# Patient Record
Sex: MALE | Race: WHITE | NOT HISPANIC OR LATINO
[De-identification: names, ages, dates, MRNs, and addresses within clinical notes are randomized per-mention and may not be internally consistent; named-entity substitution may affect disease eponyms.]

---

## 2017-08-02 ENCOUNTER — PRIOR ORIGINAL RECORDS (OUTPATIENT)
Dept: OTHER | Age: 82
End: 2017-08-02

## 2017-08-28 LAB
CHOLESTEROL, TOTAL: 136 MG/DL
HDL CHOLESTEROL: 76 MG/DL
LDL CHOLESTEROL: 51 MG/DL
TRIGLYCERIDES: 44 MG/DL

## 2017-09-15 PROBLEM — I50.22 CHRONIC SYSTOLIC HEART FAILURE (HCC): Status: ACTIVE | Noted: 2017-09-15

## 2017-09-15 PROBLEM — I95.1 ORTHOSTATIC HYPOTENSION: Status: ACTIVE | Noted: 2017-09-15

## 2017-09-15 PROBLEM — K21.9 GASTROESOPHAGEAL REFLUX DISEASE, ESOPHAGITIS PRESENCE NOT SPECIFIED: Status: ACTIVE | Noted: 2017-09-15

## 2017-09-15 PROBLEM — E11.9 TYPE 2 DIABETES MELLITUS WITHOUT COMPLICATION, WITHOUT LONG-TERM CURRENT USE OF INSULIN (HCC): Status: ACTIVE | Noted: 2017-09-15

## 2017-09-15 PROBLEM — Z86.73 HISTORY OF STROKE: Status: ACTIVE | Noted: 2017-09-15

## 2017-09-27 ENCOUNTER — PRIOR ORIGINAL RECORDS (OUTPATIENT)
Dept: OTHER | Age: 82
End: 2017-09-27

## 2017-09-30 ENCOUNTER — PRIOR ORIGINAL RECORDS (OUTPATIENT)
Dept: OTHER | Age: 82
End: 2017-09-30

## 2017-09-30 ENCOUNTER — LAB ENCOUNTER (OUTPATIENT)
Dept: LAB | Facility: HOSPITAL | Age: 82
End: 2017-09-30
Attending: INTERNAL MEDICINE
Payer: MEDICARE

## 2017-09-30 DIAGNOSIS — E78.00 HYPERCHOLESTEREMIA: ICD-10-CM

## 2017-09-30 DIAGNOSIS — E11.9 TYPE 2 DIABETES MELLITUS WITHOUT COMPLICATION, WITHOUT LONG-TERM CURRENT USE OF INSULIN (HCC): ICD-10-CM

## 2017-09-30 DIAGNOSIS — E55.9 VITAMIN D DEFICIENCY: ICD-10-CM

## 2017-09-30 DIAGNOSIS — I50.22 CHRONIC SYSTOLIC HEART FAILURE (HCC): ICD-10-CM

## 2017-09-30 PROCEDURE — 36415 COLL VENOUS BLD VENIPUNCTURE: CPT

## 2017-09-30 PROCEDURE — 83880 ASSAY OF NATRIURETIC PEPTIDE: CPT

## 2017-09-30 PROCEDURE — 84550 ASSAY OF BLOOD/URIC ACID: CPT

## 2017-09-30 PROCEDURE — 80053 COMPREHEN METABOLIC PANEL: CPT

## 2017-09-30 PROCEDURE — 84443 ASSAY THYROID STIM HORMONE: CPT

## 2017-09-30 PROCEDURE — 85025 COMPLETE CBC W/AUTO DIFF WBC: CPT

## 2017-09-30 PROCEDURE — 82306 VITAMIN D 25 HYDROXY: CPT

## 2017-09-30 PROCEDURE — 80061 LIPID PANEL: CPT

## 2017-09-30 PROCEDURE — 83036 HEMOGLOBIN GLYCOSYLATED A1C: CPT

## 2017-10-02 ENCOUNTER — PRIOR ORIGINAL RECORDS (OUTPATIENT)
Dept: OTHER | Age: 82
End: 2017-10-02

## 2017-10-02 LAB
ALBUMIN: 4.5 G/DL
ALKALINE PHOSPHATATE(ALK PHOS): 69 IU/L
ALT (SGPT): 16 U/L
AST (SGOT): 21 U/L
BILIRUBIN TOTAL: 1 MG/DL
BUN: 57 MG/DL
CALCIUM: 9.9 MG/DL
CHLORIDE: 103 MEQ/L
CHOLESTEROL, TOTAL: 168 MG/DL
CREATININE, SERUM: 2.54 MG/DL
GLOBULIN: 2.9 G/DL
GLUCOSE: 158 MG/DL
GLUCOSE: 158 MG/DL
GLYCOHEMOGLOBIN: 6.4 %
HDL CHOLESTEROL: 68 MG/DL
HEMATOCRIT: 38.5 %
HEMOGLOBIN: 12.6 G/DL
LDL CHOLESTEROL: 78 MG/DL
NON-HDL CHOLESTEROL: 100 MG/DL
PLATELETS: 126 K/UL
POTASSIUM, SERUM: 4.7 MEQ/L
PROTEIN, TOTAL: 7.4 G/DL
RED BLOOD COUNT: 4.25 X 10-6/U
SGOT (AST): 21 IU/L
SGPT (ALT): 16 IU/L
SODIUM: 141 MEQ/L
THYROID STIMULATING HORMONE: 1.4 MLU/L
TRIGLYCERIDES: 112 MG/DL
URIC ACID: 10.4 MG/DL
WHITE BLOOD COUNT: 8.1 X 10-3/U

## 2017-10-03 LAB — VITAMIN D 25-OH: 26.3 NG/ML

## 2017-10-13 ENCOUNTER — PRIOR ORIGINAL RECORDS (OUTPATIENT)
Dept: OTHER | Age: 82
End: 2017-10-13

## 2017-10-17 ENCOUNTER — PRIOR ORIGINAL RECORDS (OUTPATIENT)
Dept: OTHER | Age: 82
End: 2017-10-17

## 2017-10-20 ENCOUNTER — PRIOR ORIGINAL RECORDS (OUTPATIENT)
Dept: OTHER | Age: 82
End: 2017-10-20

## 2017-10-25 ENCOUNTER — PRIOR ORIGINAL RECORDS (OUTPATIENT)
Dept: OTHER | Age: 82
End: 2017-10-25

## 2017-11-07 ENCOUNTER — PRIOR ORIGINAL RECORDS (OUTPATIENT)
Dept: OTHER | Age: 82
End: 2017-11-07

## 2017-11-08 ENCOUNTER — PRIOR ORIGINAL RECORDS (OUTPATIENT)
Dept: OTHER | Age: 82
End: 2017-11-08

## 2017-11-08 LAB
BUN: 44 MG/DL
CALCIUM: 9.6 MG/DL
CHLORIDE: 103 MEQ/L
CREATININE, SERUM: 2.44 MG/DL
GLUCOSE: 207 MG/DL
POTASSIUM, SERUM: 5.1 MEQ/L
SODIUM: 139 MEQ/L

## 2017-11-10 ENCOUNTER — PRIOR ORIGINAL RECORDS (OUTPATIENT)
Dept: OTHER | Age: 82
End: 2017-11-10

## 2017-11-15 ENCOUNTER — PRIOR ORIGINAL RECORDS (OUTPATIENT)
Dept: OTHER | Age: 82
End: 2017-11-15

## 2017-11-16 LAB
BUN: 45 MG/DL
CALCIUM: 9.9 MG/DL
CHLORIDE: 103 MEQ/L
CREATININE, SERUM: 2.61 MG/DL
GLUCOSE: 134 MG/DL
POTASSIUM, SERUM: 5.1 MEQ/L
SODIUM: 141 MEQ/L

## 2017-11-17 ENCOUNTER — PRIOR ORIGINAL RECORDS (OUTPATIENT)
Dept: OTHER | Age: 82
End: 2017-11-17

## 2017-11-27 ENCOUNTER — HOSPITAL ENCOUNTER (INPATIENT)
Facility: HOSPITAL | Age: 82
LOS: 3 days | Discharge: HOME | DRG: 291 | End: 2017-11-30
Attending: EMERGENCY MEDICINE | Admitting: HOSPITALIST
Payer: MEDICARE

## 2017-11-27 ENCOUNTER — APPOINTMENT (OUTPATIENT)
Dept: GENERAL RADIOLOGY | Facility: HOSPITAL | Age: 82
DRG: 291 | End: 2017-11-27
Attending: EMERGENCY MEDICINE
Payer: MEDICARE

## 2017-11-27 DIAGNOSIS — I50.9 ACUTE CONGESTIVE HEART FAILURE, UNSPECIFIED CONGESTIVE HEART FAILURE TYPE: Primary | ICD-10-CM

## 2017-11-27 PROCEDURE — 85025 COMPLETE CBC W/AUTO DIFF WBC: CPT | Performed by: EMERGENCY MEDICINE

## 2017-11-27 PROCEDURE — 84484 ASSAY OF TROPONIN QUANT: CPT | Performed by: EMERGENCY MEDICINE

## 2017-11-27 PROCEDURE — 80061 LIPID PANEL: CPT | Performed by: EMERGENCY MEDICINE

## 2017-11-27 PROCEDURE — 82962 GLUCOSE BLOOD TEST: CPT

## 2017-11-27 PROCEDURE — 93005 ELECTROCARDIOGRAM TRACING: CPT

## 2017-11-27 PROCEDURE — 71010 XR CHEST AP PORTABLE  (CPT=71010): CPT | Performed by: EMERGENCY MEDICINE

## 2017-11-27 PROCEDURE — 93010 ELECTROCARDIOGRAM REPORT: CPT | Performed by: EMERGENCY MEDICINE

## 2017-11-27 PROCEDURE — 80162 ASSAY OF DIGOXIN TOTAL: CPT | Performed by: EMERGENCY MEDICINE

## 2017-11-27 PROCEDURE — 99285 EMERGENCY DEPT VISIT HI MDM: CPT

## 2017-11-27 PROCEDURE — 96374 THER/PROPH/DIAG INJ IV PUSH: CPT

## 2017-11-27 PROCEDURE — 83880 ASSAY OF NATRIURETIC PEPTIDE: CPT | Performed by: EMERGENCY MEDICINE

## 2017-11-27 PROCEDURE — 80048 BASIC METABOLIC PNL TOTAL CA: CPT | Performed by: EMERGENCY MEDICINE

## 2017-11-27 RX ORDER — LORAZEPAM 0.5 MG/1
0.5 TABLET ORAL ONCE AS NEEDED
Status: COMPLETED | OUTPATIENT
Start: 2017-11-27 | End: 2017-11-27

## 2017-11-27 RX ORDER — ZOLPIDEM TARTRATE 5 MG/1
5 TABLET ORAL NIGHTLY PRN
Status: DISCONTINUED | OUTPATIENT
Start: 2017-11-27 | End: 2017-11-27

## 2017-11-27 RX ORDER — FINASTERIDE 5 MG/1
5 TABLET, FILM COATED ORAL NIGHTLY
Status: DISCONTINUED | OUTPATIENT
Start: 2017-11-27 | End: 2017-11-30

## 2017-11-27 RX ORDER — NITROGLYCERIN 0.4 MG/1
0.4 TABLET SUBLINGUAL EVERY 5 MIN PRN
Status: DISCONTINUED | OUTPATIENT
Start: 2017-11-27 | End: 2017-11-30

## 2017-11-27 RX ORDER — DEXTROSE MONOHYDRATE 25 G/50ML
50 INJECTION, SOLUTION INTRAVENOUS AS NEEDED
Status: DISCONTINUED | OUTPATIENT
Start: 2017-11-27 | End: 2017-11-30

## 2017-11-27 RX ORDER — ACETAMINOPHEN 325 MG/1
650 TABLET ORAL EVERY 6 HOURS PRN
Status: DISCONTINUED | OUTPATIENT
Start: 2017-11-27 | End: 2017-11-30

## 2017-11-27 RX ORDER — CLOPIDOGREL BISULFATE 75 MG/1
75 TABLET ORAL DAILY
Status: DISCONTINUED | OUTPATIENT
Start: 2017-11-28 | End: 2017-11-30

## 2017-11-27 RX ORDER — SUCRALFATE 1 G/1
1 TABLET ORAL 2 TIMES DAILY PRN
Status: DISCONTINUED | OUTPATIENT
Start: 2017-11-27 | End: 2017-11-30

## 2017-11-27 RX ORDER — ACETAMINOPHEN 325 MG/1
650 TABLET ORAL EVERY 6 HOURS PRN
COMMUNITY

## 2017-11-27 RX ORDER — DIGOXIN 125 MCG
125 TABLET ORAL DAILY
Status: DISCONTINUED | OUTPATIENT
Start: 2017-11-27 | End: 2017-11-29

## 2017-11-27 RX ORDER — PANTOPRAZOLE SODIUM 40 MG/1
40 TABLET, DELAYED RELEASE ORAL
Status: DISCONTINUED | OUTPATIENT
Start: 2017-11-27 | End: 2017-11-30

## 2017-11-27 RX ORDER — BUMETANIDE 0.25 MG/ML
2 INJECTION, SOLUTION INTRAMUSCULAR; INTRAVENOUS ONCE
Status: COMPLETED | OUTPATIENT
Start: 2017-11-27 | End: 2017-11-27

## 2017-11-27 RX ORDER — ATORVASTATIN CALCIUM 10 MG/1
10 TABLET, FILM COATED ORAL NIGHTLY
Status: DISCONTINUED | OUTPATIENT
Start: 2017-11-27 | End: 2017-11-30

## 2017-11-27 RX ORDER — ONDANSETRON 2 MG/ML
4 INJECTION INTRAMUSCULAR; INTRAVENOUS EVERY 6 HOURS PRN
Status: DISCONTINUED | OUTPATIENT
Start: 2017-11-27 | End: 2017-11-30

## 2017-11-27 NOTE — HISTORICAL OFFICE NOTE
JAZMYNE REINA  : 10/30/1935  ACCOUNT:  [id number]  [phone]  PCP: Dr. Freddie Sterling     TODAY'S DATE: 11/10/2017  DICTATED BY:  Jessica RAYGOZA]    CHIEF COMPLAINT: [Followup of Cardiomyopathy, ischemic, Followup of Chronic Kidney Disease, Stage 2 since results the patient states that historically he has had potassiums higher at about 5.3. He does try to follow a low potassium diet. NYHA Class: 2  RISK FACTORS:    REVIEW OF SYSTEMS:    CONS: fatigue. EYES: denies significant visual changes.  ENMT pulses normal. ABD AORTA: deferred. FEM: deferred. PEDAL: pedal pulses intact. EXT: no peripheral edema. DECISION MAKIN. Ischemic cardiomyopathy patient appears compensated on exam today.   His weight is stable he has no signs or symptoms of vol 10/25/17 *Eliquis              2.5MG     1 TABLET BY MOUTH TWICE A DAY            10/20/17 *Allopurinol          100MG     1 TABLET DAILY.                           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 RESP: respirations with normal rate and rhythm, clear to auscultation. GI: no masses, tenderness or hepatosplenomegaly, rectal deferred. MS: adequate gait for exercise/testing. EXT: no clubbing or cyanosis.   SKIN: no r tolerating, will increase to 49/51 mg twice daily. 3.  Eliquis 2.5 mg twice daily. 4.  Dr. Trevin Jackson visit in December. 5.  Renal service evaluation.     Cc: Dr. 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   The patient carries a history of myocardial infarction in 2014 with anne replacement and 2005    PAST CV HISTORY: bypass x 2 , mitral valve replacement 2015, hypercholesteremia, and hypertension    FAMILY HISTORY: Negative for premature CAD. Negative for AAA. SOCIAL HISTORY: SMOKING: Never used tobacco. Denies smoking.  CAFFE failure physician. He maintains in [address] class 3. We will establish him with the [facility] and with the Renal Service. We will obtain repeat echocardiogram and laboratory data to assess for clinical response.   He would be an

## 2017-11-27 NOTE — ED INITIAL ASSESSMENT (HPI)
Patient complains of lili x2-3 days, states it got worse this morning, states he has not been sleeping well because of it, denies pain, no apparent distress noted during triage

## 2017-11-27 NOTE — CONSULTS
Cardiology (consult dictated)    Assessment:  1. Dyspnea. Known severe LV dysfunction. Probable mild CHF. PE less likely on Eliquis. Ischemia cannot be ruled out. 2. CKD, Stage III-IV    3. Atrial fibrillation. 4. ICD    5.  DM      Plan:  Admit over

## 2017-11-27 NOTE — H&P
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     Acute on chronic systolic HF  - presented w/ SOB  - bumex per cards  - appreciate cards peripheral pulses intact   PSYCH: appropriate mood and affect    PMH  Past Medical History:   Diagnosis Date   • Atrial fibrillation ([facility] [address]    • Diabetes ([facility] [address]    • Esophageal reflux    • Essential hypertension    • Heart attack    • Hyperlipidemia    • Str

## 2017-11-27 NOTE — ED PROVIDER NOTES
Patient Seen in: [facility] AND [facility] Emergency Department    History   Patient presents with:  Dyspnea ISRAEL SOB (respiratory)    Stated Complaint: dyspnea    HPI    57-year-old male not [place] with a history of significant cardiac disease as well as some or tenderness. Neurological: Alert and oriented to person, place, and time. Skin: Skin is warm and dry. Psychiatric: Normal mood and affect. Behavior is normal.   Nursing note and vitals reviewed.     Differential diagnosis includes pulmonary edema a TALL (BNP)     EKG    Rate, intervals and axes as noted on EKG Report.   Rate: 78  Rhythm: Sinus Rhythm  Reading: Paced rhythm, frequent PVCs, no obvious acute ischemia           ED Course as of Nov 27 1403  ------------------------------------------------- Medication List        Present on Admission  Date Reviewed: 9/15/2017          ICD-10-CM Noted POA    Acute congestive heart failure ([facility] [address] I50.9 11/27/2017 Unknown

## 2017-11-27 NOTE — CONSULTS
[facility]    PATIENT'S NAME: JAZMYNE REINA   ATTENDING PHYSICIAN: Raheem Thibodeaux, [address] PHYSICIAN: Meg Yuan.  Junito Francois MD   PATIENT ACCOUNT#:   [id number]    LOCATION:  [facility] [address] [facility] 1  MEDICAL RECORD #:   [id number]       DATE OF BIRTH: Entresto 47 to 53 one tab in the morning and half a tablet in the evening; Eliquis 2.5 b.i.d.; allopurinol 100 q.a.m.; vitamin D 1000 units daily;  Allegra 180 q.a.m.; Bumex 0.5 mg in the morning and then as needed in the evening; carvedilol 3.125 only at b history of cardiomyopathy and the markedly elevated BNP. Ischemia cannot be entirely ruled out. He did not have any chest pain with his large inferior infarct that occurred prior to his bypass. Pulmonary embolus is less likely on his Eliquis treatment.

## 2017-11-28 PROCEDURE — 82962 GLUCOSE BLOOD TEST: CPT

## 2017-11-28 PROCEDURE — 83735 ASSAY OF MAGNESIUM: CPT | Performed by: HOSPITALIST

## 2017-11-28 PROCEDURE — 80048 BASIC METABOLIC PNL TOTAL CA: CPT | Performed by: HOSPITALIST

## 2017-11-28 RX ORDER — CARVEDILOL 6.25 MG/1
6.25 TABLET ORAL 2 TIMES DAILY WITH MEALS
Status: DISCONTINUED | OUTPATIENT
Start: 2017-11-28 | End: 2017-11-28

## 2017-11-28 RX ORDER — 0.9 % SODIUM CHLORIDE 0.9 %
VIAL (ML) INJECTION
Status: COMPLETED
Start: 2017-11-28 | End: 2017-11-28

## 2017-11-28 RX ORDER — CARVEDILOL 3.12 MG/1
3.12 TABLET ORAL 2 TIMES DAILY WITH MEALS
Status: DISCONTINUED | OUTPATIENT
Start: 2017-11-28 | End: 2017-11-28

## 2017-11-28 RX ORDER — MAGNESIUM OXIDE 400 MG (241.3 MG MAGNESIUM) TABLET
1 TABLET ONCE AS NEEDED
Status: DISCONTINUED | OUTPATIENT
Start: 2017-11-28 | End: 2017-11-28

## 2017-11-28 RX ORDER — CARVEDILOL 3.12 MG/1
3.12 TABLET ORAL 2 TIMES DAILY WITH MEALS
Status: DISCONTINUED | OUTPATIENT
Start: 2017-11-28 | End: 2017-11-29

## 2017-11-28 RX ORDER — MAGNESIUM OXIDE 400 MG (241.3 MG MAGNESIUM) TABLET
3 TABLET NIGHTLY
Status: DISCONTINUED | OUTPATIENT
Start: 2017-11-28 | End: 2017-11-29

## 2017-11-28 NOTE — PROGRESS NOTES
[facility] Hospitalist Progress Note     CC: Hospital Follow up    PCP: Kristi Puentes MD       Assessment/Plan:     Principal Problem:    Acute congestive heart failure, unspecified congestive heart failure type [facility])  Active Problems:    Acute Mago Piety SpO2 99 %.     Temp:  [97.4 °F (36.3 °C)-98.2 °F (36.8 °C)] 97.4 °F (36.3 °C)  Pulse:  [69-88] 70  Resp:  [14-18] 16  BP: ()/(44-72) 86/57      Intake/Output:    Intake/Output Summary (Last 24 hours) at 11/28/17 2285  Last data filed at 11/28/17 4425 dextrose 50%, Glucose-Vitamin C, acetaminophen, ondansetron HCl, sucralfate

## 2017-11-28 NOTE — PROGRESS NOTES
[facility]    Progress Note    Murzena Mckeon Patient Status:  Inpatient    10/30/1935 MRN [id number]   Location [facility] 3W/SW Attending Gaurav Lyons MD   Hosp Day # 1 PCP Rajani Cabrera MD         Assessment and Pl Results:     Lab Results  Component Value Date   WBC 7.7 11/27/2017   HGB 11.8 (L) 11/27/2017   HCT 35.2 (L) 11/27/2017    (L) 11/27/2017   CREATSERUM 2.23 (H) 11/28/2017   BUN 38 (H) 11/28/2017    11/28/2017   K 4.4 11/28/2017   C

## 2017-11-28 NOTE — PROGRESS NOTES
[facility] Pharmacy Note:  Age Based Dose Adjustment    Yobani Amos has been prescribed zolpidem (AMBIEN) 10 mg PO nightly PRN. Patient is >71 years old therefore the dose has been adjusted to 5 mg, may repeat x1 dose if needed.       Thank you,  Alondra Acosta, [facility]

## 2017-11-29 PROCEDURE — 80048 BASIC METABOLIC PNL TOTAL CA: CPT | Performed by: INTERNAL MEDICINE

## 2017-11-29 PROCEDURE — 85027 COMPLETE CBC AUTOMATED: CPT | Performed by: HOSPITALIST

## 2017-11-29 PROCEDURE — 82962 GLUCOSE BLOOD TEST: CPT

## 2017-11-29 RX ORDER — LORAZEPAM 0.5 MG/1
0.5 TABLET ORAL NIGHTLY PRN
Status: DISCONTINUED | OUTPATIENT
Start: 2017-11-29 | End: 2017-11-30

## 2017-11-29 RX ORDER — METOPROLOL SUCCINATE 25 MG/1
12.5 TABLET, EXTENDED RELEASE ORAL NIGHTLY
Status: DISCONTINUED | OUTPATIENT
Start: 2017-11-29 | End: 2017-11-30

## 2017-11-29 RX ORDER — TRAZODONE HYDROCHLORIDE 50 MG/1
25 TABLET ORAL ONCE
Status: COMPLETED | OUTPATIENT
Start: 2017-11-29 | End: 2017-11-29

## 2017-11-29 RX ORDER — DIGOXIN 125 MCG
125 TABLET ORAL EVERY OTHER DAY
Status: DISCONTINUED | OUTPATIENT
Start: 2017-11-30 | End: 2017-11-30

## 2017-11-29 NOTE — PLAN OF CARE
Patient O2 sat 82-87% on room air. States he is not in distress.  present. Patient denies O2; states he is going bact to [facility] and does not want O2.

## 2017-11-29 NOTE — PROGRESS NOTES
S: No c/o     O:   Blood pressure (!) 77/51, pulse 78, temperature 98.4 °F (36.9 °C), temperature source Oral, resp. rate 20, height 170.2 cm (5' 7\"), weight 144 lb 1.6 oz (65.4 kg), SpO2 98 %.        11/28/17  2111 11/29/17  0306 11/29/17  0500 11/29/17 complication, without long-term current use of insulin (HCC)     Gastroesophageal reflux disease, esophagitis presence not specified     Chronic systolic heart failure (HCC)     Acute congestive heart failure (HCC)     Acute congestive heart failure, unspe

## 2017-11-29 NOTE — PROGRESS NOTES
Core measure update: EF 10–15%. On carvedilol. On Entresto.   Currently no Spironolactone due to low blood pressures, may consider in the future if clinically appropriate

## 2017-11-29 NOTE — PROGRESS NOTES
[facility] Hospitalist Progress Note     CC: Hospital Follow up    PCP: Gisella William MD       Assessment/Plan:     Principal Problem:    Acute congestive heart failure, unspecified congestive heart failure type [facility])  Active Problems:    Acute Lola Chiu strong, would rather have ativan. No dizziness. Still with mild SOB. OBJECTIVE:    Blood pressure 90/61, pulse 70, temperature 98.4 °F (36.9 °C), temperature source Oral, resp.  rate 20, height 170.2 cm (5' 7\"), weight 144 lb 1.6 oz (65.4 kg), SpO2 98 abnormality.            Meds:     • [START ON 11/30/2017] digoxin  125 mcg Oral QOD   • Insulin Aspart Pen  1-5 Units Subcutaneous TID CC   • apixaban  2.5 mg Oral BID   • Clopidogrel Bisulfate  75 mg Oral Daily   • finasteride  5 mg Oral Nightly   • Pantop

## 2017-11-30 ENCOUNTER — PRIOR ORIGINAL RECORDS (OUTPATIENT)
Dept: OTHER | Age: 82
End: 2017-11-30

## 2017-11-30 VITALS
HEART RATE: 70 BPM | RESPIRATION RATE: 19 BRPM | BODY MASS INDEX: 22.74 KG/M2 | OXYGEN SATURATION: 100 % | TEMPERATURE: 98 F | HEIGHT: 67 IN | WEIGHT: 144.88 LBS | SYSTOLIC BLOOD PRESSURE: 105 MMHG | DIASTOLIC BLOOD PRESSURE: 69 MMHG

## 2017-11-30 PROCEDURE — 80048 BASIC METABOLIC PNL TOTAL CA: CPT | Performed by: HOSPITALIST

## 2017-11-30 PROCEDURE — 82962 GLUCOSE BLOOD TEST: CPT

## 2017-11-30 RX ORDER — METOPROLOL SUCCINATE 25 MG/1
12.5 TABLET, EXTENDED RELEASE ORAL NIGHTLY
Qty: 30 TABLET | Refills: 1 | Status: SHIPPED | OUTPATIENT
Start: 2017-11-30 | End: 2018-01-16

## 2017-11-30 RX ORDER — BUMETANIDE 1 MG/1
0.5 TABLET ORAL DAILY
Status: DISCONTINUED | OUTPATIENT
Start: 2017-11-30 | End: 2017-11-30

## 2017-11-30 RX ORDER — BUMETANIDE 0.5 MG/1
TABLET ORAL
Qty: 90 TABLET | Refills: 1 | Status: SHIPPED | OUTPATIENT
Start: 2017-11-30 | End: 2018-02-01

## 2017-11-30 NOTE — DISCHARGE SUMMARY
[facility] Hospitalist Discharge Summary   Patient ID:  Aniket Ellis  [id number]  17 year old  10/30/1935    Admit date: 11/27/2017  Discharge date: 11/30/2017  Primary Care Physician: Mane Hoover MD   Attending Physician: Jade Gary DO   Consults: hypotension     Afib  - rate controlled  - coreg changed to toprol  - cont digoxin  - cont eliquis     CAD s/p CABG, hx of MVR  - cont home meds  - on plavix, BB, statin, entresto     Weight loss/dysphagia  - previously followed with GI, but recently moved as: LANOXIN  TAKE 1 TABLET BY MOUTH EVERY OTHER DAY     ENTRESTO 24-26 MG Tabs  Generic drug:  Sacubitril-Valsartan  Take 1 tablet by mouth 2 (two) times daily.      GLUCOCARD VITAL TEST Strp  Testing twice daily     NASONEX NA     Pantoprazole Sodium 40 M with orthopedic spine physician  Contact information:  [address] [facility]             Schedule an appointment as soon as possible for a visit with Janett Knott MD.    Specialties:  CARDIOLOGY, Internal Medic

## 2017-11-30 NOTE — CM/SW NOTE
11/30/17 CM Discharge planning   Pt resides at THE [facility] apt, reports independent prior to admission and plans to return home when medically stable.    Transport home arranged via [employer], today at 1:30   [employer]  MEGHANA [id number]

## 2017-11-30 NOTE — PROGRESS NOTES
S: No c/o , wants to go home     O:   Blood pressure 105/69, pulse 71, temperature 98 °F (36.7 °C), temperature source Oral, resp. rate 19, height 170.2 cm (5' 7\"), weight 144 lb 14.4 oz (65.7 kg), SpO2 100 %.        11/29/17 2000 11/30/17  0502 11/30/17 disease, esophagitis presence not specified     Chronic systolic heart failure (HCC)     Acute congestive heart failure (HCC)     Acute congestive heart failure, unspecified congestive heart failure type ([facility] [address]    Component      Latest Ref Rng & Units 11/30

## 2017-12-01 ENCOUNTER — TELEPHONE (OUTPATIENT)
Dept: MEDSURG UNIT | Facility: HOSPITAL | Age: 82
End: 2017-12-01

## 2017-12-04 ENCOUNTER — PRIOR ORIGINAL RECORDS (OUTPATIENT)
Dept: OTHER | Age: 82
End: 2017-12-04

## 2017-12-08 PROBLEM — K21.9 GASTROESOPHAGEAL REFLUX DISEASE, ESOPHAGITIS PRESENCE NOT SPECIFIED: Status: RESOLVED | Noted: 2017-09-15 | Resolved: 2017-12-08

## 2017-12-08 PROBLEM — I50.9 ACUTE CONGESTIVE HEART FAILURE, UNSPECIFIED CONGESTIVE HEART FAILURE TYPE: Status: RESOLVED | Noted: 2017-11-27 | Resolved: 2017-12-08

## 2017-12-13 ENCOUNTER — PRIOR ORIGINAL RECORDS (OUTPATIENT)
Dept: OTHER | Age: 82
End: 2017-12-13

## 2017-12-20 LAB
BUN: 42 MG/DL
CALCIUM: 9 MG/DL
CHLORIDE: 108 MEQ/L
CREATININE, SERUM: 1.79 MG/DL
GLUCOSE: 127 MG/DL
HEMATOCRIT: 32 %
HEMOGLOBIN: 10.8 G/DL
PLATELETS: 103 K/UL
POTASSIUM, SERUM: 4.1 MEQ/L
RED BLOOD COUNT: 10.8 X 10-6/U
SODIUM: 138 MEQ/L
WHITE BLOOD COUNT: 3.5 X 10-3/U

## 2017-12-22 ENCOUNTER — PRIOR ORIGINAL RECORDS (OUTPATIENT)
Dept: OTHER | Age: 82
End: 2017-12-22

## 2018-01-08 ENCOUNTER — HOSPITAL ENCOUNTER (INPATIENT)
Facility: HOSPITAL | Age: 83
LOS: 8 days | Discharge: HOME HEALTH | DRG: 291 | End: 2018-01-16
Attending: EMERGENCY MEDICINE | Admitting: HOSPITALIST
Payer: MEDICARE

## 2018-01-08 ENCOUNTER — APPOINTMENT (OUTPATIENT)
Dept: GENERAL RADIOLOGY | Facility: HOSPITAL | Age: 83
DRG: 291 | End: 2018-01-08
Attending: EMERGENCY MEDICINE
Payer: MEDICARE

## 2018-01-08 DIAGNOSIS — R07.89 CHEST TIGHTNESS: ICD-10-CM

## 2018-01-08 DIAGNOSIS — R06.00 DYSPNEA ON EXERTION: Primary | ICD-10-CM

## 2018-01-08 DIAGNOSIS — R77.8 ELEVATED TROPONIN: ICD-10-CM

## 2018-01-08 PROBLEM — R79.89 ELEVATED TROPONIN: Status: ACTIVE | Noted: 2018-01-08

## 2018-01-08 PROBLEM — R06.09 DYSPNEA ON EXERTION: Status: ACTIVE | Noted: 2018-01-08

## 2018-01-08 PROCEDURE — 81001 URINALYSIS AUTO W/SCOPE: CPT | Performed by: HOSPITALIST

## 2018-01-08 PROCEDURE — 87581 M.PNEUMON DNA AMP PROBE: CPT | Performed by: NURSE PRACTITIONER

## 2018-01-08 PROCEDURE — 80048 BASIC METABOLIC PNL TOTAL CA: CPT | Performed by: EMERGENCY MEDICINE

## 2018-01-08 PROCEDURE — 84443 ASSAY THYROID STIM HORMONE: CPT | Performed by: NURSE PRACTITIONER

## 2018-01-08 PROCEDURE — 36415 COLL VENOUS BLD VENIPUNCTURE: CPT

## 2018-01-08 PROCEDURE — 87631 RESP VIRUS 3-5 TARGETS: CPT | Performed by: EMERGENCY MEDICINE

## 2018-01-08 PROCEDURE — 80061 LIPID PANEL: CPT | Performed by: EMERGENCY MEDICINE

## 2018-01-08 PROCEDURE — 93010 ELECTROCARDIOGRAM REPORT: CPT | Performed by: EMERGENCY MEDICINE

## 2018-01-08 PROCEDURE — 83880 ASSAY OF NATRIURETIC PEPTIDE: CPT | Performed by: EMERGENCY MEDICINE

## 2018-01-08 PROCEDURE — 82962 GLUCOSE BLOOD TEST: CPT

## 2018-01-08 PROCEDURE — 84484 ASSAY OF TROPONIN QUANT: CPT | Performed by: HOSPITALIST

## 2018-01-08 PROCEDURE — 71045 X-RAY EXAM CHEST 1 VIEW: CPT | Performed by: EMERGENCY MEDICINE

## 2018-01-08 PROCEDURE — 85025 COMPLETE CBC W/AUTO DIFF WBC: CPT | Performed by: EMERGENCY MEDICINE

## 2018-01-08 PROCEDURE — 93005 ELECTROCARDIOGRAM TRACING: CPT

## 2018-01-08 PROCEDURE — 83036 HEMOGLOBIN GLYCOSYLATED A1C: CPT | Performed by: HOSPITALIST

## 2018-01-08 PROCEDURE — 99285 EMERGENCY DEPT VISIT HI MDM: CPT

## 2018-01-08 PROCEDURE — 87798 DETECT AGENT NOS DNA AMP: CPT | Performed by: NURSE PRACTITIONER

## 2018-01-08 PROCEDURE — 87633 RESP VIRUS 12-25 TARGETS: CPT | Performed by: NURSE PRACTITIONER

## 2018-01-08 PROCEDURE — 87486 CHLMYD PNEUM DNA AMP PROBE: CPT | Performed by: NURSE PRACTITIONER

## 2018-01-08 PROCEDURE — 93010 ELECTROCARDIOGRAM REPORT: CPT | Performed by: HOSPITALIST

## 2018-01-08 PROCEDURE — 84484 ASSAY OF TROPONIN QUANT: CPT | Performed by: EMERGENCY MEDICINE

## 2018-01-08 RX ORDER — TRAMADOL HYDROCHLORIDE 50 MG/1
50 TABLET ORAL EVERY 12 HOURS PRN
Status: DISCONTINUED | OUTPATIENT
Start: 2018-01-08 | End: 2018-01-10

## 2018-01-08 RX ORDER — LORAZEPAM 0.5 MG/1
0.5 TABLET ORAL EVERY 8 HOURS PRN
Status: DISCONTINUED | OUTPATIENT
Start: 2018-01-08 | End: 2018-01-16

## 2018-01-08 RX ORDER — MOMETASONE 50 UG/1
1 SPRAY, METERED NASAL
Status: DISCONTINUED | OUTPATIENT
Start: 2018-01-09 | End: 2018-01-11

## 2018-01-08 RX ORDER — ACETAMINOPHEN 325 MG/1
650 TABLET ORAL EVERY 6 HOURS PRN
Status: DISCONTINUED | OUTPATIENT
Start: 2018-01-08 | End: 2018-01-16

## 2018-01-08 RX ORDER — DEXTROSE MONOHYDRATE 25 G/50ML
50 INJECTION, SOLUTION INTRAVENOUS AS NEEDED
Status: DISCONTINUED | OUTPATIENT
Start: 2018-01-08 | End: 2018-01-08

## 2018-01-08 RX ORDER — GUAIFENESIN 100 MG/5ML
100 SOLUTION ORAL EVERY 4 HOURS PRN
Status: DISCONTINUED | OUTPATIENT
Start: 2018-01-08 | End: 2018-01-16

## 2018-01-08 RX ORDER — METOPROLOL SUCCINATE 25 MG/1
12.5 TABLET, EXTENDED RELEASE ORAL NIGHTLY
Status: DISCONTINUED | OUTPATIENT
Start: 2018-01-08 | End: 2018-01-08

## 2018-01-08 RX ORDER — ZOLPIDEM TARTRATE 10 MG/1
10 TABLET ORAL NIGHTLY PRN
Status: DISCONTINUED | OUTPATIENT
Start: 2018-01-08 | End: 2018-01-09

## 2018-01-08 RX ORDER — PANTOPRAZOLE SODIUM 40 MG/1
40 TABLET, DELAYED RELEASE ORAL
Status: DISCONTINUED | OUTPATIENT
Start: 2018-01-08 | End: 2018-01-16

## 2018-01-08 RX ORDER — NITROGLYCERIN 0.4 MG/1
0.4 TABLET SUBLINGUAL EVERY 5 MIN PRN
Status: DISCONTINUED | OUTPATIENT
Start: 2018-01-08 | End: 2018-01-16

## 2018-01-08 RX ORDER — DEXTROSE MONOHYDRATE 25 G/50ML
50 INJECTION, SOLUTION INTRAVENOUS AS NEEDED
Status: DISCONTINUED | OUTPATIENT
Start: 2018-01-08 | End: 2018-01-16

## 2018-01-08 RX ORDER — SODIUM CHLORIDE 0.9 % (FLUSH) 0.9 %
3 SYRINGE (ML) INJECTION AS NEEDED
Status: DISCONTINUED | OUTPATIENT
Start: 2018-01-08 | End: 2018-01-16

## 2018-01-08 RX ORDER — GUAIFENESIN 600 MG
600 TABLET, EXTENDED RELEASE 12 HR ORAL 2 TIMES DAILY
Status: DISCONTINUED | OUTPATIENT
Start: 2018-01-08 | End: 2018-01-11

## 2018-01-08 RX ORDER — DIGOXIN 125 MCG
125 TABLET ORAL EVERY OTHER DAY
Status: DISCONTINUED | OUTPATIENT
Start: 2018-01-09 | End: 2018-01-16

## 2018-01-08 RX ORDER — BUMETANIDE 0.25 MG/ML
1 INJECTION, SOLUTION INTRAMUSCULAR; INTRAVENOUS ONCE
Status: COMPLETED | OUTPATIENT
Start: 2018-01-08 | End: 2018-01-08

## 2018-01-08 RX ORDER — CARVEDILOL 3.12 MG/1
3.12 TABLET ORAL 2 TIMES DAILY WITH MEALS
Status: DISCONTINUED | OUTPATIENT
Start: 2018-01-08 | End: 2018-01-16

## 2018-01-08 RX ORDER — DIGOXIN 125 MCG
125 TABLET ORAL DAILY
Status: DISCONTINUED | OUTPATIENT
Start: 2018-01-08 | End: 2018-01-08

## 2018-01-08 RX ORDER — ALLOPURINOL 100 MG/1
100 TABLET ORAL
Status: DISCONTINUED | OUTPATIENT
Start: 2018-01-08 | End: 2018-01-16

## 2018-01-08 RX ORDER — ECHINACEA PURPUREA EXTRACT 125 MG
1 TABLET ORAL
Status: DISCONTINUED | OUTPATIENT
Start: 2018-01-08 | End: 2018-01-16

## 2018-01-08 RX ORDER — PRAVASTATIN SODIUM 20 MG
40 TABLET ORAL NIGHTLY
Status: DISCONTINUED | OUTPATIENT
Start: 2018-01-08 | End: 2018-01-16

## 2018-01-08 RX ORDER — CLOPIDOGREL BISULFATE 75 MG/1
75 TABLET ORAL DAILY
Status: DISCONTINUED | OUTPATIENT
Start: 2018-01-08 | End: 2018-01-16

## 2018-01-08 RX ORDER — 0.9 % SODIUM CHLORIDE 0.9 %
VIAL (ML) INJECTION
Status: DISPENSED
Start: 2018-01-08 | End: 2018-01-09

## 2018-01-08 RX ORDER — HEPARIN SODIUM 5000 [USP'U]/ML
5000 INJECTION, SOLUTION INTRAVENOUS; SUBCUTANEOUS EVERY 8 HOURS SCHEDULED
Status: CANCELLED | OUTPATIENT
Start: 2018-01-08

## 2018-01-08 RX ORDER — BUMETANIDE 1 MG/1
0.5 TABLET ORAL DAILY
Status: DISCONTINUED | OUTPATIENT
Start: 2018-01-09 | End: 2018-01-16

## 2018-01-08 RX ORDER — ONDANSETRON 2 MG/ML
4 INJECTION INTRAMUSCULAR; INTRAVENOUS EVERY 6 HOURS PRN
Status: DISCONTINUED | OUTPATIENT
Start: 2018-01-08 | End: 2018-01-16

## 2018-01-08 NOTE — CONSULTS
[facility]    Report of Cardiology Consultation    Eldon Vargas Patient Status:  Inpatient    10/30/1935 MRN [id number]   Location [facility] 3W/SW Attending Whitney Garcia,    Hosp Day # 0 PCP Silvia Nur MD     Date of has been SOB with exertion. He felt so weak that he needed to use his walker yesterday. He has taken extra dose of bumex for last two nights due to increase in his weight from 124 lbs to 126 lbs.  He feels like his uncontrolled afib is an issues and has not tab 75 mg 75 mg Oral Daily   Sacubitril-Valsartan (ENTRESTO) 24-26 MG per tab 1 tablet 1 tablet Oral BID   LORazepam (ATIVAN) tab 0.5 mg 0.5 mg Oral Q8H PRN   [START ON 1/9/2018] Mometasone Furoate (NASONEX) nasal spray 1 spray 1 spray Each Nare Daily   Pa daily. Pantoprazole Sodium 40 MG Oral Tab EC Take 1 tablet (40 mg total) by mouth 2 (two) times daily before meals. Pravastatin Sodium 40 MG Oral Tab Take 1 tablet (40 mg total) by mouth nightly.    TRAMADOL HCL 50 MG Oral Tab TAKE 1 TABLET BY MOUTH DEBBIE murmur. Lungs: Clear with normal effort. Normal excursions and effort. Abdomen: Soft, non-tender. BS-present. Extremities: Without clubbing, cyanosis. Peripheral pulsespresent. Neurologic: Alert and oriented, normal affect. Motor ok.   Skin: Warm and

## 2018-01-08 NOTE — HISTORICAL OFFICE NOTE
JAZMYNE REINA  : 10/30/1935  ACCOUNT:  [id number]  [phone]  PCP: Dr. Jil Membreno DATE: 2017  DICTATED BY:  [Dr. Lai Dies: [Followup of CAD, of native vessels.]    HPI:    [On 2017, kate Nichols [de-identified] HEM/LYMPH: easy bruising. ALL: no new food or enviornmental allergies.       PAST HISTORY: diabetes, gastritis, mini- stroke, kidney problem, anxiety, depression, hip replacement and 2005    PAST CV HISTORY: bypass x 2 , mitral valve replacement 2015, hyper low likelihood of success in high risk in his case and therefore is to be left with permanent atrial fibrillation questionable whether asymptomatic.   2.  Chronic systolic congestive heart failure with EF 10% difficult to treat on medical therapy follows wi significant visual changes. ENMT: ringing and reduced hearing. CV: palpitations. RESP: snoring. GI: denies melena, hematochezia. : frequency of urination. INTEG: no new rashes, lesions. MS: low back pain. NEURO: weakness and on one side.  HEM/LYMPH: easy 94%.  In atrial fibrillation 84% of the time.   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 125MG   1 tab at [facility]                              09/27/17 Clopidogrel Bisulfate 75MG      1 tab daily                              09/27/17 Digoxin               125MCG    1 tab at AM every other day              09/27/17 Entresto              24-26MG 09/27/17 Digoxin               125MCG    1 tab at AM every other day              09/27/17 Finasteride           5MG       1 tab in the evening                     09/27/17 Nasacort Allergy 24HR 55MCG/AC  once daily as needed

## 2018-01-08 NOTE — H&P
[facility] Hospitalist Team  History and Physical     ASSESSMENT / PLAN:   [de-identified] yo male with hx chronic systolic chf, ICMP S/P ICD, atrial fibrillation, CKD stage 3-4, malignant melanoma, CAD S/P CABG, S/P MVR, HL, HTN, DM type II, previous CVA with left sided ginna Fanny Man MD      Concerns regarding plan of care were discussed with patient. Patient agrees with plan as detailed above.  Discussed plan of care with Dr. Taft Kawasaki RN, NP  [facility] Hospitalist Team  Pager [phone]  Answering Service number: [phone] EXTREMITIES: b/L mild LE edema L>R, no cyanosis;, no calf tenderness; peripheral pulses intact     PMH  Past Medical History:   Diagnosis Date   • Arrhythmia     atrial fibrillation   • BPH (benign prostatic hyperplasia)    • Coronary atherosclerosis Sodium 40 MG Oral Tab EC Take 1 tablet (40 mg total) by mouth 2 (two) times daily before meals. Disp: 90 tablet Rfl: 3   Pravastatin Sodium 40 MG Oral Tab Take 1 tablet (40 mg total) by mouth nightly.  Disp: 90 tablet Rfl: 3       Soc Hx     Smoking status: SpO2 99%   BMI 24.59 kg/m²     Gen: No acute distress, alert and oriented x3  Neck Supple, no JVD  Pulm: Lungs clear, normal respiratory effort, No wheezing or crackles  CV: Heart with irreg irreg rhythm, No murmurs, rubs, gallops  Abd: Abdomen soft, non above

## 2018-01-08 NOTE — ED PROVIDER NOTES
Patient Seen in: [facility] AND [facility] Emergency Department    History   Patient presents with:  Chest Pain Angina (cardiovascular)    Stated Complaint: chest tightness    HPI    27-year-old male with history of atrial fibrillation, coronary artery disease p signs reviewed. All other systems reviewed and negative except as noted above.     Physical Exam   ED Triage Vitals [01/08/18 0837]  BP: 101/67  Pulse: 81  Resp: 18  Temp: n/a  Temp src: n/a  SpO2: 96 %  O2 Device: None (Room air)    Current:BP 96/71 -----------         ------                     CBC W/ DIFFERENTIAL[[id number]]          Abnormal            Final result                 Please view results for these tests on the individual orders.    BNP (B TYPE NATRIUERTIC PEPTIDE)   LIPID PANEL

## 2018-01-08 NOTE — PROGRESS NOTES
[facility] Pharmacy Note:  Renal Dose Adjustment for Tramadol Marianne Argueta    Flakito Greer has been prescribed Tramadol (ULTRAM) 50 mg orally every 6 hours as needed for pain. Estimated Creatinine Clearance: 22.9 mL/min (based on SCr of 2.41 mg/dL (H)).     His mohini

## 2018-01-08 NOTE — ED INITIAL ASSESSMENT (HPI)
Pt arrived via medics to rm 42 for complaint of chest tightness with exertion, states history of afib and is scheduled for ablation

## 2018-01-09 PROCEDURE — 82962 GLUCOSE BLOOD TEST: CPT

## 2018-01-09 PROCEDURE — 93010 ELECTROCARDIOGRAM REPORT: CPT | Performed by: INTERNAL MEDICINE

## 2018-01-09 PROCEDURE — 83735 ASSAY OF MAGNESIUM: CPT | Performed by: HOSPITALIST

## 2018-01-09 PROCEDURE — 85025 COMPLETE CBC W/AUTO DIFF WBC: CPT | Performed by: HOSPITALIST

## 2018-01-09 PROCEDURE — 97162 PT EVAL MOD COMPLEX 30 MIN: CPT

## 2018-01-09 PROCEDURE — 93005 ELECTROCARDIOGRAM TRACING: CPT

## 2018-01-09 PROCEDURE — 80048 BASIC METABOLIC PNL TOTAL CA: CPT | Performed by: HOSPITALIST

## 2018-01-09 RX ORDER — SODIUM CHLORIDE 9 MG/ML
INJECTION, SOLUTION INTRAVENOUS CONTINUOUS
Status: DISCONTINUED | OUTPATIENT
Start: 2018-01-09 | End: 2018-01-09

## 2018-01-09 RX ORDER — SODIUM CHLORIDE 9 MG/ML
INJECTION, SOLUTION INTRAVENOUS CONTINUOUS
Status: DISCONTINUED | OUTPATIENT
Start: 2018-01-10 | End: 2018-01-16

## 2018-01-09 RX ORDER — SODIUM CHLORIDE 0.9 % (FLUSH) 0.9 %
10 SYRINGE (ML) INJECTION AS NEEDED
Status: DISCONTINUED | OUTPATIENT
Start: 2018-01-09 | End: 2018-01-16

## 2018-01-09 RX ORDER — ZOLPIDEM TARTRATE 5 MG/1
5 TABLET ORAL NIGHTLY PRN
Status: DISCONTINUED | OUTPATIENT
Start: 2018-01-09 | End: 2018-01-16

## 2018-01-09 NOTE — PHYSICAL THERAPY NOTE
PHYSICAL THERAPY EVALUATION - INPATIENT     Room Number: 305/305-A  Evaluation Date: 1/9/2018  Type of Evaluation: Initial  Physician Order: PT Eval and Treat    Presenting Problem: dyspnea on exertion  Reason for Therapy: Mobility Dysfunction and Disc atherosclerosis    • Depression    • Diabetes ([facility] [address]    • Esophageal reflux    • Gout    • Heart attack    • High blood pressure    • High cholesterol    • Neuropathy    • Renal disorder     chronic kidney disease   • Stroke [facility])        Past Surgical Histo Sitting down on and standing up from a chair with arms (e.g., wheelchair, bedside commode, etc.): A Little   -   Moving from lying on back to sitting on the side of the bed?: None   How much help from another person does the patient currently need. ..   -

## 2018-01-09 NOTE — PROGRESS NOTES
[facility]    Cardiology Progress Note    Salsanjana Parent Patient Status:  Inpatient    10/30/1935 MRN [id number]   Location [facility] 3W/SW Attending Blossom Hannon DO   Hosp Day # 1 PCP Katy Feldman MD         Assessment a Daily   • GuaiFENesin ER  600 mg Oral BID   • Insulin Aspart Pen  1-5 Units Subcutaneous TID CC   • digoxin  125 mcg Oral QOD         Results:     Lab Results  Component Value Date   WBC 6.9 01/09/2018   HGB 10.5 (L) 01/09/2018   HCT 31.6 (L) 01/09/2018

## 2018-01-09 NOTE — PROGRESS NOTES
Measure update: Most recent EF approximately 10%. On carvedilol. On Entresto. Currently no Spironolactone due to concerns for renal function.   May consider in the future if clinically appropriate

## 2018-01-09 NOTE — PROGRESS NOTES
[facility] Hospitalist Team  Progress Note    Shikha Cartagena Patient Status:  Inpatient    10/30/1935 MRN [id number]   Location [facility] 3W/SW Attending Carlos Flores, DO   Hosp Day # 1 PCP Marc Paulino MD     CC: Follow Up  PCP: Kevin Lentz pending  -home regimen: tradjneta 5 mg daily  -hold home oral medications, SSI prn  -accuchecks  -ADA diet      GERD  -PPI     Rhinitis  -Nasonex     Gout  -continue allopurinol     CKD Stage 3-4  -baseline Cr per Epic 1.79-2.5, admission Cr 2.4   -follow 2104  01/09/18   0758   PGLU  134*  143*  153*  125*       Recent Labs   Lab  01/08/18   0926  01/08/18   1144  01/08/18   1524   TROP  0.06*  0.06*  0.06*           MEDICATIONS        Current Facility-Administered Medications:  Normal Saline Flush 0.9 % i cardiopulmonary abnormality and unchanged since 11/27/17. SEE ATTENDING NOTE BELOW:   Patient seen and examined independently. Discussed with APN and agree with note above. S: Feeling the same, still dyspnea with ambulation.  Better at res infarct  -as per cards     CAD S/P CABG  -continue plavix, statin  -as per cards     DM Type II  -HgbA1C 6.7, below goal for age  -home regimen: tradjenta 5 mg daily  -hold home oral medications, SSI prn  -accuchecks  -ADA diet      GERD  -PPI     Rhinitis

## 2018-01-10 ENCOUNTER — APPOINTMENT (OUTPATIENT)
Dept: INTERVENTIONAL RADIOLOGY/VASCULAR | Facility: HOSPITAL | Age: 83
DRG: 291 | End: 2018-01-10
Attending: NURSE PRACTITIONER
Payer: MEDICARE

## 2018-01-10 PROCEDURE — 99152 MOD SED SAME PHYS/QHP 5/>YRS: CPT

## 2018-01-10 PROCEDURE — 02583ZZ DESTRUCTION OF CONDUCTION MECHANISM, PERCUTANEOUS APPROACH: ICD-10-PCS | Performed by: INTERNAL MEDICINE

## 2018-01-10 PROCEDURE — 80048 BASIC METABOLIC PNL TOTAL CA: CPT | Performed by: NURSE PRACTITIONER

## 2018-01-10 PROCEDURE — 82962 GLUCOSE BLOOD TEST: CPT

## 2018-01-10 PROCEDURE — 93650 ICAR CATH ABLTJ AV NODE FUNC: CPT

## 2018-01-10 PROCEDURE — 4A0234Z MEASUREMENT OF CARDIAC ELECTRICAL ACTIVITY, PERCUTANEOUS APPROACH: ICD-10-PCS | Performed by: INTERNAL MEDICINE

## 2018-01-10 PROCEDURE — 4B02XSZ MEASUREMENT OF CARDIAC PACEMAKER, EXTERNAL APPROACH: ICD-10-PCS | Performed by: INTERNAL MEDICINE

## 2018-01-10 PROCEDURE — 85025 COMPLETE CBC W/AUTO DIFF WBC: CPT | Performed by: NURSE PRACTITIONER

## 2018-01-10 PROCEDURE — 99153 MOD SED SAME PHYS/QHP EA: CPT

## 2018-01-10 RX ORDER — SODIUM CHLORIDE 9 MG/ML
INJECTION, SOLUTION INTRAVENOUS
Status: COMPLETED
Start: 2018-01-10 | End: 2018-01-10

## 2018-01-10 RX ORDER — MORPHINE SULFATE 2 MG/ML
1 INJECTION, SOLUTION INTRAMUSCULAR; INTRAVENOUS ONCE
Status: COMPLETED | OUTPATIENT
Start: 2018-01-10 | End: 2018-01-10

## 2018-01-10 RX ORDER — LIDOCAINE HYDROCHLORIDE 20 MG/ML
INJECTION, SOLUTION EPIDURAL; INFILTRATION; INTRACAUDAL; PERINEURAL
Status: COMPLETED
Start: 2018-01-10 | End: 2018-01-10

## 2018-01-10 RX ORDER — ONDANSETRON 2 MG/ML
4 INJECTION INTRAMUSCULAR; INTRAVENOUS EVERY 6 HOURS PRN
Status: DISCONTINUED | OUTPATIENT
Start: 2018-01-10 | End: 2018-01-16

## 2018-01-10 RX ORDER — SODIUM CHLORIDE 0.9 % (FLUSH) 0.9 %
10 SYRINGE (ML) INJECTION AS NEEDED
Status: DISCONTINUED | OUTPATIENT
Start: 2018-01-10 | End: 2018-01-16

## 2018-01-10 RX ORDER — MIDAZOLAM HYDROCHLORIDE 1 MG/ML
INJECTION INTRAMUSCULAR; INTRAVENOUS
Status: DISCONTINUED
Start: 2018-01-10 | End: 2018-01-10 | Stop reason: WASHOUT

## 2018-01-10 RX ORDER — CALCIUM CARBONATE 200(500)MG
500 TABLET,CHEWABLE ORAL 3 TIMES DAILY PRN
Status: DISCONTINUED | OUTPATIENT
Start: 2018-01-10 | End: 2018-01-12

## 2018-01-10 RX ORDER — LIDOCAINE HYDROCHLORIDE AND EPINEPHRINE 10; 10 MG/ML; UG/ML
INJECTION, SOLUTION INFILTRATION; PERINEURAL
Status: COMPLETED
Start: 2018-01-10 | End: 2018-01-10

## 2018-01-10 RX ORDER — MIDAZOLAM HYDROCHLORIDE 1 MG/ML
INJECTION INTRAMUSCULAR; INTRAVENOUS
Status: COMPLETED
Start: 2018-01-10 | End: 2018-01-10

## 2018-01-10 RX ORDER — HEPARIN SODIUM 1000 [USP'U]/ML
INJECTION, SOLUTION INTRAVENOUS; SUBCUTANEOUS
Status: COMPLETED
Start: 2018-01-10 | End: 2018-01-10

## 2018-01-10 RX ORDER — TRAMADOL HYDROCHLORIDE 50 MG/1
50 TABLET ORAL EVERY 12 HOURS PRN
Status: DISCONTINUED | OUTPATIENT
Start: 2018-01-10 | End: 2018-01-16

## 2018-01-10 NOTE — PROCEDURES
Procedures performed:  1. RFA of AV node  2. Pacer interrogation with reprogramming.     : Ray Villegas MD    Indication: Chronic AF with difficult to control rates, intolerance to medicines    Complication: none  Moderate conscious sedation for this

## 2018-01-10 NOTE — PROGRESS NOTES
[facility] Pharmacy Note:  Renal Dose Adjustment for Tramadol Ginoyareli Schultz    Leila Leung has been prescribed Tramadol (ULTRAM) 50 mg orally every 6 hours as needed for pain. Estimated Creatinine Clearance: 24.6 mL/min (based on SCr of 2.24 mg/dL (H)).        His

## 2018-01-10 NOTE — PROGRESS NOTES
[facility] Hospitalist Team  Progress Note    Eldon Vargas Patient Status:  Inpatient    10/30/1935 MRN [id number]   Location [facility] 3W/SW Attending Whitney Garcia,    Hosp Day # 2 PCP Silvia Nur MD     CC: Follow Up  PCP: Dionna Hurley amoxicillin  -CXR negative for acute process  -RVP negative  -mucinex, robitussin, ocean nasal sprays  -follow     CAD S/P CABG  -continue plavix, statin  -as per cards     DM Type II  -HgbA1C 6.7  -home regimen: tradjneta 5 mg daily  -hold home oral medic 2.41*  2.41*  2.24*   CA  9.3  8.7  8.8   MG   --   2.1   --    GLU  190*  113*  144*       No results for input(s): ALT, AST, ALB, AMYLASE, LIPASE, LDH in the last 72 hours.     Invalid input(s): ALPHOS, TBIL, DBIL, TPROT    Recent Labs   Lab  01/08/18   2 Each Nare Q3H PRN   GuaiFENesin ER (MUCINEX) 12 hr tab 600 mg 600 mg Oral BID   guaiFENesin (ROBITUSSIN) 100 MG/5ML solution 100 mg 100 mg Oral Q4H PRN   dextrose 50% injection 50 mL 50 mL Intravenous PRN   Glucose-Vitamin C (DEX-4) 4-0.006 g chewable tab Uncontrolled HR S/P RFA AV Ablation  -per pt HR up to 120 at home, has been using robitusin DM with recent viral illness/sinus infection. HR controlled so far.  Did not tolerate toprol and amiodarone in past  -tele  -1/10/17 S/P RFA of AV Node  -resume nataliaq

## 2018-01-10 NOTE — PHYSICAL THERAPY NOTE
Attempted to see patient for physical therapy session. Per EMR, MD stated vascular recovery overnight due to difficult groin access, and patient on bedrest at this time until 2pm. Spoke with RN, ok to check on patient tomorrow for physical therapy session.

## 2018-01-10 NOTE — CM/SW NOTE
[facility]  Face to Face Encounter Note    Jarad Llamas Patient Status:  Inpatient    10/30/1935 MRN [id number]   Location [facility] 3W/SW Attending Pepe Grant, [address] [facility] Day # 2 PCP Patito Murry MD       I, or a non-physician pr my care, and I have initiated the establishment of the plan of care. This physician will be followed by a physician who will periodically review the plan of care.   The findings from this face-to-face encounter have been communicated with the patient's com

## 2018-01-10 NOTE — PROGRESS NOTES
[facility]    Cardiology Progress Note    Lead Rear Patient Status:  Inpatient    10/30/1935 MRN [id number]   Location [facility] Attending Héctor Petit, [address] [facility] Day # 2 PCP Shona Monroe MD Daily   • GuaiFENesin ER  600 mg Oral BID   • Insulin Aspart Pen  1-5 Units Subcutaneous TID CC   • digoxin  125 mcg Oral QOD         Results:     Lab Results  Component Value Date   WBC 7.5 01/10/2018   HGB 12.0 (L) 01/10/2018   HCT 36.3 (L) 01/10/2018

## 2018-01-10 NOTE — CM/SW NOTE
The pt. Was admitted from [facility] in [place].  His plan is to return when medically stable. Referral has been sent to [facility]. Specific [facility] orders and a face to face are needed prior to discharge.       Kelsy Sheriff[facility] ext [phone]

## 2018-01-10 NOTE — PLAN OF CARE
PLAN FOR ABLATION TOMORROW WITH DR. Jimenez Rodríguez, PT. VERY ANXIOUS ABOUT PROCEDURE, PRN ATIVAN GIVEN, NAUSEA, PRN ZOFRAN GIVEN

## 2018-01-11 PROCEDURE — 80048 BASIC METABOLIC PNL TOTAL CA: CPT | Performed by: NURSE PRACTITIONER

## 2018-01-11 PROCEDURE — 82962 GLUCOSE BLOOD TEST: CPT

## 2018-01-11 PROCEDURE — 85025 COMPLETE CBC W/AUTO DIFF WBC: CPT | Performed by: NURSE PRACTITIONER

## 2018-01-11 PROCEDURE — 83735 ASSAY OF MAGNESIUM: CPT | Performed by: NURSE PRACTITIONER

## 2018-01-11 PROCEDURE — 97116 GAIT TRAINING THERAPY: CPT

## 2018-01-11 PROCEDURE — 97530 THERAPEUTIC ACTIVITIES: CPT

## 2018-01-11 NOTE — PHYSICAL THERAPY NOTE
PHYSICAL THERAPY TREATMENT NOTE - INPATIENT    Room Number: 367/599-C       Presenting Problem: dyspnea on exertion    Problem List  Principal Problem:    Dyspnea on exertion  Active Problems:    Chest tightness    Elevated troponin      ASSESSMENT   Tena Karen    AM-PAC Score:  Raw Score: 20   PT Approx Degree of Impairment Score: 35.83%   Standardized Score (AM-PAC Scale): 47.67   CMS Modifier (G-Code): CJ    FUNCTIONAL ABILITY STATUS  Gait Assessment   Gait Assistance:  (CGA)  Distance (ft): 200ft  Assi

## 2018-01-11 NOTE — FACE TO FACE NOTE
[facility]   Face to Face Encounter Note    Myrla Gilford Patient Status:  Inpatient    10/30/1935 MRN [id number]   Location [facility] 3W/SW Attending Kalyan Rivera, [address] [facility] [place] Day # 3 PCP Jaxson Soares MD       I, or a non-physician periodically review the plan of care. The findings from this face-to-face encounter have been communicated with the patient's community-based physician who will be assuming this patient's home health plan of care.     Samuel Lobo RN, NP  [facility] Hospitalist T

## 2018-01-11 NOTE — CM/SW NOTE
1/11/18 CM Discharge planning   HHC orders and face to face faxed to [facility] via [employer]. Spoke with [employer] intake at [facility], advised anticipate pt discharging home later today.    Shaye Mitchell  X [phone]

## 2018-01-11 NOTE — PROGRESS NOTES
[facility] Heart Cardiology   Progress Note    Lisa Her Patient Status:  Inpatient    10/30/1935 MRN [id number]   Location [facility] 3W/SW Attending Rosette Monge, [address] [facility] Day # 3 PCP Gogo Nur MD -continue Eliquis 2.5mg BID, HgB and groin sites stable    2) Acute on chronic systolic CHF  -last echo 62/14/68 with EF10-15%  -hx of [employer] Bi-Vi ICD, set to VVIR   -More SOB/weak today.  Dry weight 145lb?, 156lb today but does not appear gross Electronically signed on 01/09/2018 at 15:45 by JARET Lee  6/29/9837

## 2018-01-11 NOTE — PROGRESS NOTES
[facility] Hospitalist Team  Progress Note    Jimenez Rodríguez Patient Status:  Inpatient    10/30/1935 MRN [id number]   Location [facility] 3W/SW Attending Sandhya Beavers DO   Hosp Day # 3 PCP Karla Garcia MD     CC: Follow Up  PCP: Rashad Rogers cards     Recent URI  -tx self for sinus infection with amoxicillin  -CXR negative for acute process  -RVP negative  -mucinex will stop, robitussin prn, ocean nasal sprays  -follow     CAD S/P CABG  -continue plavix, statin  -as per cards     DM Type II  - 126*       Recent Labs   Lab  01/08/18   0926  01/09/18   0448  01/10/18   0514  01/11/18   0747   NA  141  139  139  137   K  4.6  4.0  4.2  4.3   CL  103  106  107  104   CO2  27  28  23  23   BUN  41*  43*  47*  50*   CREATSERUM  2.41*  2.41*  2.24*  2. tab 0.5 mg 0.5 mg Oral Q8H PRN   Mometasone Furoate (NASONEX) nasal spray 1 spray 1 spray Each Nare Daily   Pantoprazole Sodium (PROTONIX) EC tab 40 mg 40 mg Oral BID AC   Pravastatin Sodium (PRAVACHOL) tab 40 mg 40 mg Oral Nightly   bumetanide (BUMEX) tab controlled HR  -dose IV bumex 1 mg given and resumed home bumex  Diuretics per cards  -digoxin entresto and coreg per cards  -complaining of SOB today, does not appear volume overloaded, per cardiology appears compensated.  Did have mild improvement with na

## 2018-01-12 PROBLEM — R06.00 DYSPNEA ON EXERTION: Status: RESOLVED | Noted: 2018-01-08 | Resolved: 2018-01-12

## 2018-01-12 PROBLEM — R07.89 CHEST TIGHTNESS: Status: RESOLVED | Noted: 2018-01-08 | Resolved: 2018-01-12

## 2018-01-12 PROBLEM — R06.09 DYSPNEA ON EXERTION: Status: RESOLVED | Noted: 2018-01-08 | Resolved: 2018-01-12

## 2018-01-12 PROCEDURE — 97116 GAIT TRAINING THERAPY: CPT

## 2018-01-12 PROCEDURE — 82962 GLUCOSE BLOOD TEST: CPT

## 2018-01-12 PROCEDURE — 80048 BASIC METABOLIC PNL TOTAL CA: CPT | Performed by: NURSE PRACTITIONER

## 2018-01-12 PROCEDURE — 97110 THERAPEUTIC EXERCISES: CPT

## 2018-01-12 RX ORDER — DOBUTAMINE HYDROCHLORIDE 200 MG/100ML
2.5 INJECTION INTRAVENOUS CONTINUOUS
Status: DISCONTINUED | OUTPATIENT
Start: 2018-01-12 | End: 2018-01-14

## 2018-01-12 RX ORDER — MAGNESIUM HYDROXIDE/ALUMINUM HYDROXICE/SIMETHICONE 120; 1200; 1200 MG/30ML; MG/30ML; MG/30ML
30 SUSPENSION ORAL 4 TIMES DAILY PRN
Status: DISCONTINUED | OUTPATIENT
Start: 2018-01-12 | End: 2018-01-16

## 2018-01-12 RX ORDER — CALCIUM CARBONATE 200(500)MG
500 TABLET,CHEWABLE ORAL EVERY 6 HOURS PRN
Status: DISCONTINUED | OUTPATIENT
Start: 2018-01-12 | End: 2018-01-16

## 2018-01-12 NOTE — PLAN OF CARE
CARDIOVASCULAR - ADULT    • Maintains optimal cardiac output and hemodynamic stability Not Progressing          CARDIOVASCULAR - ADULT    • Absence of cardiac arrhythmias or at baseline Progressing        Diabetes/Glucose Control    • Glucose maintained wi

## 2018-01-12 NOTE — PROGRESS NOTES
[facility]    Cardiology Progress Note    Aniket Long Patient Status:  Inpatient    10/30/1935 MRN [id number]   Location [facility] 3W/SW Attending Mary Hannah MD   Hosp Day # 4 PCP Mane Hoover MD     Primary Car instructions reviewed with patient and verbalized understanding   -HgB and groin sites stable     Acute on chronic systolic HF 2/2 to ICM  last echo 10/16/17 with EF10-15%, euvolemic on exam, NYHA III  -continue Entresto BID, Coreg, and Digoxin - no spiron

## 2018-01-12 NOTE — PHYSICAL THERAPY NOTE
PHYSICAL THERAPY TREATMENT NOTE - INPATIENT    Room Number: 538/667-D       Presenting Problem: dyspnea on exertion    Problem List  Active Problems:    Elevated troponin      ASSESSMENT   Patient agree to PT treatment.  Patient bed mobility Inde, sit<>sta Karen    AM-PAC Score:  Raw Score: 20   PT Approx Degree of Impairment Score: 35.83%   Standardized Score (AM-PAC Scale): 47.67   CMS Modifier (G-Code): CJ    FUNCTIONAL ABILITY STATUS  Gait Assessment   Gait Assistance:  (CGA)  Distance (ft): 200ft x 2

## 2018-01-12 NOTE — PROGRESS NOTES
S: feels better , still diarrhea     O:   Blood pressure 96/65, pulse 71, temperature (!) 97.5 °F (36.4 °C), temperature source Oral, resp. rate 18, height 170.2 cm (5' 7\"), weight 156 lb 4.8 oz (70.9 kg), SpO2 97 %.        01/11/18  1400 01/11/18  1753 01 mellitus without complication, without long-term current use of insulin (HCC)     Chronic systolic heart failure (HCC)     Acute congestive heart failure (HCC)     Dyspnea on exertion     Chest tightness     Elevated troponin            Impression:   1.  Duc

## 2018-01-12 NOTE — PROGRESS NOTES
[facility] Hospitalist Team  Progress Note    [place] Rear Patient Status:  Inpatient    10/30/1935 MRN [id number]   Location [facility] 3W/SW Attending Omid Kemp DO   Hosp Day # 4 PCP Shona Monroe MD     CC: Follow Up  PCP: Roger Reynoso MI  -troponin 0.06-->0.06-->0.06, also same values during 11/27/17 admission  -EKG with SR with ventricular ectopy and previous infarct  -as per cards     Recent URI  -tx self for sinus infection with amoxicillin  -CXR negative for acute process  -RVP nega NT, ND, BS+  EXTREMITIES: B/L groins without hematoma or ecchymosis, no edema, no calf tenderness    DIAGNOSTIC DATA:   Labs:     Recent Labs   Lab  01/10/18   0514  01/11/18   0618   WBC  7.5  6.3   HGB  12.0*  12.0*   MCV  89.9  90.6   PLT  139*  126* 3.125 mg 3.125 mg Oral BID with meals   Clopidogrel Bisulfate (PLAVIX) tab 75 mg 75 mg Oral Daily   Sacubitril-Valsartan (ENTRESTO) 24-26 MG per tab 1 tablet 1 tablet Oral BID   LORazepam (ATIVAN) tab 0.5 mg 0.5 mg Oral Q8H PRN   Pantoprazole Sodium (LINDSEY symptoms/sinus infection, SOB with exertion, palpitations, weakness.  Pt found to have acute on chronic chf likely exacerbated by uncontrolled HR with underlying afib in setting of recent URI. Pt is S/P  AV matthew ablation.  Now with  Issues with N/V, likely prn  -accuchecks  -ADA diet      GERD  -PPI     Rhinitis  -Nasonex, will hold with nasal dryness     Gout  -continue allopurinol     CKD Stage 3-4  -baseline Cr per Epic 1.79-2.5, admission Cr 2.4, now 2.2  -follow Cr      HL  -statin     Chronic Anemia  -

## 2018-01-13 PROCEDURE — 83735 ASSAY OF MAGNESIUM: CPT | Performed by: HOSPITALIST

## 2018-01-13 PROCEDURE — 82962 GLUCOSE BLOOD TEST: CPT

## 2018-01-13 PROCEDURE — 85025 COMPLETE CBC W/AUTO DIFF WBC: CPT | Performed by: NURSE PRACTITIONER

## 2018-01-13 PROCEDURE — 87507 IADNA-DNA/RNA PROBE TQ 12-25: CPT | Performed by: INTERNAL MEDICINE

## 2018-01-13 PROCEDURE — 87493 C DIFF AMPLIFIED PROBE: CPT | Performed by: INTERNAL MEDICINE

## 2018-01-13 PROCEDURE — 87641 MR-STAPH DNA AMP PROBE: CPT | Performed by: INTERNAL MEDICINE

## 2018-01-13 PROCEDURE — 80048 BASIC METABOLIC PNL TOTAL CA: CPT | Performed by: NURSE PRACTITIONER

## 2018-01-13 RX ORDER — FLUTICASONE PROPIONATE 50 MCG
1 SPRAY, SUSPENSION (ML) NASAL 2 TIMES DAILY
Status: DISCONTINUED | OUTPATIENT
Start: 2018-01-13 | End: 2018-01-16

## 2018-01-13 NOTE — PLAN OF CARE
Problem: Patient Centered Care  Goal: Patient preferences are identified and integrated in the patient's plan of care  Interventions:  - What would you like us to know as we care for you? Please take your time when caring for me.   - Provide timely, complet Monitor vital signs, rhythm, and trends  - Monitor for bleeding, hypotension and signs of decreased cardiac output  - Evaluate effectiveness of vasoactive medications to optimize hemodynamic stability  - Monitor arterial and/or venous puncture sites for bl intake (undernourished)  INTERVENTIONS:  - Monitor percentage of each meal consumed  - Identify factors contributing to decreased intake, treat as appropriate  - Assist with meals as needed  - Monitor I&O, WT and lab values  - Obtain nutritional consult as

## 2018-01-13 NOTE — PLAN OF CARE
PT. WITH LOW BLOOD PRESSURES ALL DAY, PT. RECEIVED 2 IVF BOLUSES, PT. TO START ON DOBUTAMINE DRIP TONIGHT

## 2018-01-13 NOTE — PROGRESS NOTES
[facility] Hospitalist Progress Note     PCP: Clarissa Ramirez MD    CC: Follow up       Assessment/Plan:     Mr. Rayna Vargas is an [de-identified] yo male with hx chronic systolic chf, ICMP S/P ICD, atrial fibrillation, CKD stage 3-4, malignant melanoma, CAD S/P CABG, S/P cards     N/V/D- ?  Viral Gastroenteritis-worsened overnight, +norovirus  -had on 1/9 and then none from 10-11 and returned on the 12th in the evening  - panel + for norovirus  -supportive care  -follow     Elevated Troponin in Setting of CKD, hx of Elevate Weights  01/12/18 0618 : 156 lb 11.2 oz (71.1 kg)  01/11/18 0506 : 156 lb 4.8 oz (70.9 kg)  01/10/18 0658 : 155 lb 3.2 oz (70.4 kg)  01/09/18 0528 : 162 lb 4.8 oz (73.6 kg)  01/08/18 1117 : 157 lb (71.2 kg)  12/08/17 1321 : 147 lb (66.7 kg)  11/30/17 0524 Labs:     Recent Labs   Lab  01/11/18   0618  01/13/18   0511   WBC  6.3  6.7   HGB  12.0*  10.8*   MCV  90.6  90.6   PLT  126*  125*       Recent Labs   Lab  01/11/18   0747  01/12/18   0529  01/13/18   0511   NA  137  136  139   K  4.3  3.8  3.5   CL

## 2018-01-13 NOTE — SPIRITUAL CARE NOTE
Pt states he is struggling with forgiveness for choices made in past and sometimes feel God has abandoned him. He welcomes encouragement and much prayer. He also prays his own prayers along with .     Follow up Visit:  Pt stated he feels hopeless

## 2018-01-13 NOTE — PROGRESS NOTES
Pt with c/o of upset stomach. NOTIFIED MD AND RECEIVED ORDERS FOR MYLANTA AND INCREASED FREQUENCY OF TUMS. ADMINISTERED MYLANTA. PT SITTING ON SIDE OF BED. EXPLAINED TO PT THAT HE WILL REMAIN ON BR UNTIL BLOOD PRESSURE IS MORE STABLE. PT AGREED WITH PLAN.

## 2018-01-13 NOTE — CM/SW NOTE
SW spoke with RN to clarify the dc order. RN states the order is no longer active as pt had a change in status. SW will remain available as needs are indicated. Previously, a plan was made for pt to dc to home with [facility].  Update given to their week

## 2018-01-13 NOTE — PROGRESS NOTES
[facility] AND [facility]  Progress Note    Flakito Greer Patient Status:  Inpatient    10/30/1935 MRN [id number]   Location [facility] 2W/SW Attending Thaddeus Cordova MD   Hosp Day # 5 PCP Clarissa Ramirez MD     Assessment:    1.   Transfer to [facility] Results  Component Value Date   WBC 6.7 01/13/2018   HGB 10.8 01/13/2018   HCT 32.8 01/13/2018    01/13/2018     No results found for: PT, INR    Lab Results  Component Value Date    01/13/2018   K 3.5 01/13/2018    01/13/2018   CO2 22 0

## 2018-01-13 NOTE — PROGRESS NOTES
PT  ALERTX4 NO C/O DIZZINESS OR FATIGUE. BP 88/62. INITIATED 250 NS BOLUS. WILL CONT TO MONITOR AT BEDSIDE.

## 2018-01-14 PROCEDURE — 85025 COMPLETE CBC W/AUTO DIFF WBC: CPT | Performed by: HOSPITALIST

## 2018-01-14 PROCEDURE — 82962 GLUCOSE BLOOD TEST: CPT

## 2018-01-14 PROCEDURE — 97116 GAIT TRAINING THERAPY: CPT

## 2018-01-14 PROCEDURE — 84132 ASSAY OF SERUM POTASSIUM: CPT | Performed by: HOSPITALIST

## 2018-01-14 PROCEDURE — 97164 PT RE-EVAL EST PLAN CARE: CPT

## 2018-01-14 PROCEDURE — 80048 BASIC METABOLIC PNL TOTAL CA: CPT | Performed by: HOSPITALIST

## 2018-01-14 NOTE — PROGRESS NOTES
[facility] AND [facility]  Progress Note    Evorn Notice Patient Status:  Inpatient    10/30/1935 MRN [id number]   Location [facility] 2W/ Attending Kandi Groves MD   Hosp Day # 6 PCP Elie Champagne MD     Assessment:       1.   Transfer to Value Date   WBC 5.2 01/14/2018   HGB 10.1 01/14/2018   HCT 31.2 01/14/2018    01/14/2018     No results found for: PT, INR    Lab Results  Component Value Date    01/14/2018   K 4.1 01/14/2018   K 4.1 01/14/2018    01/14/2018   CO2 26 0

## 2018-01-14 NOTE — PHYSICAL THERAPY NOTE
PHYSICAL THERAPY  RE-EVALUATION - INPATIENT     Room Number: 224/224-A  Evaluation Date: 1/14/2018  Type of Evaluation: re-evaluation   Physician Order: See Comment for Specific Order (Re-evaluation due to transfer to CCU )    Presenting Problem: dyspn Problems:    Elevated troponin      Past Medical History  Past Medical History:   Diagnosis Date   • Atrial fibrillation ([facility] [address]    • BPH (benign prostatic hyperplasia)    • CKD stage 3 secondary to diabetes [facility])    • Coronary atherosclerosis    • Depressio activity. AM-PAC '6-Clicks' INPATIENT SHORT FORM - BASIC MOBILITY  How much difficulty does the patient currently have. ..  -   Turning over in bed (including adjusting bedclothes, sheets and blankets)?: None   -   Sitting down on and standing up from a Status    Goal #6    Goal #6  Current Status

## 2018-01-14 NOTE — PLAN OF CARE
NURSING TRANSFER NOTE     Patient transferred from: CCU  Patient transferred with: nathen, cell phone + Tori Slade in the [facility]   Report received from: Karuna Flores  RN    Oriented to room  Safety precautions, bed low position, use of call light and placed wi

## 2018-01-14 NOTE — PLAN OF CARE
CARDIOVASCULAR - ADULT    • Maintains optimal cardiac output and hemodynamic stability Progressing    • Absence of cardiac arrhythmias or at baseline Progressing    Ventricular paced w/ biventricular AICD. Hx A-fib. HR 70s. S/p cardioversion and ablation.

## 2018-01-14 NOTE — PROGRESS NOTES
[facility] Hospitalist Progress Note     PCP: Boy Finley MD    CC: Follow up       Assessment/Plan:     Mr. Christian Galvan is an [de-identified] yo male with hx chronic systolic chf, ICMP S/P ICD, atrial fibrillation, CKD stage 3-4, malignant melanoma, CAD S/P CABG, S/P toprol and amiodarone in past  -tele  -1/10/17 S/P RFA of AV Node  -eliquis  -as per cards     N/V/D- ?  Viral Gastroenteritis-worsened overnight, +norovirus  -had on 1/9 and then none from 10-11 and returned on the 12th in the evening  -overnight on 1/13/1 [address] filed at 01/13/18 1800   Gross per 24 hour   Intake              150 ml   Output              450 ml   Net             -300 ml       Last 3 Weights  01/14/18 0625 : 155 lb 12.8 oz (70.7 kg)  01/12/18 0618 : 156 lb 11.2 oz (71.1 kg)  01/11/1 guaiFENesin, dextrose 50%, Glucose-Vitamin C    Data Review:       Labs:     Recent Labs   Lab  01/13/18   0511  01/14/18   0417   WBC  6.7  5.2   HGB  10.8*  10.1*   MCV  90.6  91.1   PLT  125*  119*       Recent Labs   Lab  01/11/18   0747  01/12/18   05

## 2018-01-15 PROCEDURE — 82962 GLUCOSE BLOOD TEST: CPT

## 2018-01-15 PROCEDURE — 85025 COMPLETE CBC W/AUTO DIFF WBC: CPT | Performed by: HOSPITALIST

## 2018-01-15 RX ORDER — CYCLOBENZAPRINE HCL 10 MG
10 TABLET ORAL 3 TIMES DAILY PRN
Status: DISCONTINUED | OUTPATIENT
Start: 2018-01-15 | End: 2018-01-16

## 2018-01-15 RX ORDER — SUCRALFATE 1 G/1
1 TABLET ORAL EVERY 6 HOURS PRN
Status: DISCONTINUED | OUTPATIENT
Start: 2018-01-15 | End: 2018-01-16

## 2018-01-15 RX ORDER — CARVEDILOL 3.12 MG/1
3.12 TABLET ORAL 2 TIMES DAILY WITH MEALS
Qty: 60 TABLET | Refills: 0 | Status: SHIPPED | OUTPATIENT
Start: 2018-01-15

## 2018-01-15 NOTE — DISCHARGE SUMMARY
[facility] Hospitalist Discharge Summary   Patient ID:  Mushtaq Ann  [id number]  [de-identified]year old  10/30/1935    Admit date: 1/8/2018  Discharge date: 1/16/2018  Risk of Readmission Lace+ Score: 74  59-90 High Risk  29-58 Medium Risk  0-28   Low Risk    Primary Care likely exacerbated by uncontrolled HR with underlying afib in setting of recent URI. Pt is S/P AV matthew ablation. Post procedure had with  Issues with N/V/D 2/2 norovirus.  Pt then had hypotension unresponsive to IVF and required dobutamine gtt, BP now impr acute MI  -troponin 0.06-->0.06-->0.06, also same values during 11/27/17 admission  -EKG with SR with ventricular ectopy and previous infarct     Recent URI  -tx self for sinus infection with amoxicillin  -CXR negative for acute process  -RVP negative  -na GLUCOCARD VITAL TEST Strp  Testing twice daily     LORazepam 0.5 MG Tabs  Commonly known as:  ATIVAN  TAKE 1 TABLET BY MOUTH EVERY 6 HOURS AS NEEDED     NASONEX NA     Pantoprazole Sodium 40 MG Tbec  Commonly known as:  PROTONIX  Take 1 tablet (40 mg tot questions and state understand and agree with therapeutic plan as outlined    Gary Melton RN, NP   [facility] Hospitalist Team  Pager [phone]  Answering Service number: [phone]  1/15/2018      SEE ATTENDING NOTE BELOW      Patient seen and examined ind

## 2018-01-15 NOTE — PROGRESS NOTES
[facility] Hospitalist Team  Progress Note    Cori Her Patient Status:  Inpatient    10/30/1935 MRN [id number]   Location [facility] 3W/SW Attending Austin Quick MD   Hosp Day # 7 PCP Gogo Nur MD     CC: Follow Up  PCP: Brooke Davis, cards     Atrial Fibrillation with Uncontrolled HR S/P RFA AV Ablation  -per pt HR up to 120 at home, has been using robitusin DM with recent viral illness/sinus infection. HR controlled so far.  Did not tolerate toprol and amiodarone in past  -tele  -1/10/ Team  Pager [phone]   Answering Service number: [phone]  1/15/2018    SUBJECTIVE:   States he had a terrible night, was unable to breath as his upper back was having spasms, he has hx of flexeril use in past. Nasal stuffiness improved.  Ate some e Oral QID PRN   apixaban (ELIQUIS) tab 2.5 mg 2.5 mg Oral BID   Normal Saline Flush 0.9 % injection 10 mL 10 mL Intravenous PRN   ondansetron HCl (ZOFRAN) injection 4 mg 4 mg Intravenous Q6H PRN   TraMADol HCl (ULTRAM) tab 50 mg 50 mg Oral Q12H PRN   Normal SOB    objective  BP 95/70 (BP Location: Right arm)   Pulse 70   Temp 97.9 °F (36.6 °C) (Oral)   Resp 18   Ht 170.2 cm (5' 7\")   Wt 149 lb (67.6 kg)   SpO2 96%   BMI 23.34 kg/m²     Gen: No acute distress, alert and oriented x3   Neck Supple, no JVD  Pulm now 6423 on 1/8/18  -dry wt 145->156 on 1/13/18n due to fluid 2/2 hypotension, see above  -likely worsened with uncontrolled HR at home, no with controlled HR  -dose IV bumex 1 mg given on admission  and  Resumed home bumex but diuretics held on 1/12/18, n

## 2018-01-15 NOTE — PROGRESS NOTES
[facility] AND [facility]  Progress Note    Delta Chaka Patient Status:  Inpatient    10/30/1935 MRN [id number]   Location [facility] 2W/SW Attending Humza Mcdonald MD   Hosp Day # 7 PCP Ester Montiel MD     Assessment:       1.   ICM s/p CAB Lab Results  Component Value Date   TROP 0.06 () 01/08/2018   TROP 0.06 () 01/08/2018   TROP 0.06 () 01/08/2018        Medications:    • Fluticasone Propionate  1 spray Each Nare BID   • apixaban  2.5 mg Oral BID   • allopurinol  100 mg Oral Lluvia

## 2018-01-16 VITALS
HEIGHT: 67 IN | DIASTOLIC BLOOD PRESSURE: 67 MMHG | OXYGEN SATURATION: 97 % | WEIGHT: 142.19 LBS | BODY MASS INDEX: 22.32 KG/M2 | TEMPERATURE: 98 F | SYSTOLIC BLOOD PRESSURE: 108 MMHG | HEART RATE: 75 BPM | RESPIRATION RATE: 16 BRPM

## 2018-01-16 PROCEDURE — 80048 BASIC METABOLIC PNL TOTAL CA: CPT | Performed by: NURSE PRACTITIONER

## 2018-01-16 PROCEDURE — 85025 COMPLETE CBC W/AUTO DIFF WBC: CPT | Performed by: NURSE PRACTITIONER

## 2018-01-16 PROCEDURE — 97110 THERAPEUTIC EXERCISES: CPT

## 2018-01-16 PROCEDURE — 82962 GLUCOSE BLOOD TEST: CPT

## 2018-01-16 PROCEDURE — 97116 GAIT TRAINING THERAPY: CPT

## 2018-01-16 NOTE — PLAN OF CARE
CARDIOVASCULAR - ADULT    • Maintains optimal cardiac output and hemodynamic stability Completed    • Absence of cardiac arrhythmias or at baseline Completed        Diabetes/Glucose Control    • Glucose maintained within prescribed range Completed        G

## 2018-01-16 NOTE — CM/SW NOTE
1/16/18 CM Discharge planning   Spoke with [facility] intake at [facility], advised pt will be discharging home later today. [facility] viviane follow up with pt for caregivers from [facility].   [facility] services to see pt in am.  Transport home arranged via

## 2018-01-16 NOTE — PROGRESS NOTES
[facility]    Progress Note    Lissa Kunz Patient Status:  Inpatient    10/30/1935 MRN [id number]   Location [facility] 3W/SW Attending Art Smith MD   Hosp Day # 8 PCP Michael Rose MD       Assessment and Plan: Daily   • Sacubitril-Valsartan  1 tablet Oral BID   • Pantoprazole Sodium  40 mg Oral BID AC   • Pravastatin Sodium  40 mg Oral Nightly   • bumetanide  0.5 mg Oral Daily   • Insulin Aspart Pen  1-5 Units Subcutaneous TID CC   • digoxin  125 mcg Oral QOD

## 2018-01-16 NOTE — PHYSICAL THERAPY NOTE
PHYSICAL THERAPY TREATMENT NOTE - INPATIENT    Room Number: 550/176-Z       Presenting Problem: dyspnea on exertion, norovirus; transfered to CCU due to hypotensive    Problem List  Active Problems:    Elevated troponin      ASSESSMENT   Pt was seen for s PAIN ASSESSMENT   Ratin  Location: denies pain and seemed comfortable  Management Techniques: Activity promotion; Body mechanics;Breathing techniques;Repositioning    BALANCE mobility: IND   Goal #1   Current Status  MI   Goal #2 Patient is able to demonstrate transfers IND from bed, chair and toilet. Goal #2  Current Status  MI with RW   Goal #3 Patient is able to ambulate 200'x 2 with cane or walker, SBA.     Goal #3   Curr

## 2018-01-17 ENCOUNTER — TELEPHONE (OUTPATIENT)
Dept: CARDIOLOGY UNIT | Facility: HOSPITAL | Age: 83
End: 2018-01-17

## 2018-01-22 ENCOUNTER — OFFICE VISIT (OUTPATIENT)
Dept: CARDIOLOGY CLINIC | Facility: HOSPITAL | Age: 83
End: 2018-01-22
Attending: INTERNAL MEDICINE
Payer: MEDICARE

## 2018-01-22 VITALS
DIASTOLIC BLOOD PRESSURE: 74 MMHG | WEIGHT: 158 LBS | BODY MASS INDEX: 25 KG/M2 | HEART RATE: 85 BPM | OXYGEN SATURATION: 99 % | SYSTOLIC BLOOD PRESSURE: 112 MMHG

## 2018-01-22 DIAGNOSIS — I50.22 CHRONIC SYSTOLIC HEART FAILURE (HCC): ICD-10-CM

## 2018-01-22 DIAGNOSIS — I50.9 HEART FAILURE, UNSPECIFIED (HCC): Primary | ICD-10-CM

## 2018-01-22 LAB
ANION GAP SERPL CALC-SCNC: 12 MMOL/L (ref 0–18)
BUN SERPL-MCNC: 38 MG/DL (ref 8–20)
BUN/CREAT SERPL: 15.6 (ref 10–20)
CALCIUM SERPL-MCNC: 9.2 MG/DL (ref 8.5–10.5)
CHLORIDE SERPL-SCNC: 101 MMOL/L (ref 95–110)
CO2 SERPL-SCNC: 25 MMOL/L (ref 22–32)
CREAT SERPL-MCNC: 2.43 MG/DL (ref 0.5–1.5)
GLUCOSE SERPL-MCNC: 169 MG/DL (ref 70–99)
OSMOLALITY UR CALC.SUM OF ELEC: 299 MOSM/KG (ref 275–295)
POTASSIUM SERPL-SCNC: 4.9 MMOL/L (ref 3.3–5.1)
SODIUM SERPL-SCNC: 138 MMOL/L (ref 136–144)

## 2018-01-22 PROCEDURE — 93005 ELECTROCARDIOGRAM TRACING: CPT

## 2018-01-22 PROCEDURE — 99214 OFFICE O/P EST MOD 30 MIN: CPT | Performed by: CLINICAL NURSE SPECIALIST

## 2018-01-22 PROCEDURE — 93010 ELECTROCARDIOGRAM REPORT: CPT | Performed by: CLINICAL NURSE SPECIALIST

## 2018-01-22 PROCEDURE — 80048 BASIC METABOLIC PNL TOTAL CA: CPT | Performed by: CLINICAL NURSE SPECIALIST

## 2018-01-22 PROCEDURE — 36415 COLL VENOUS BLD VENIPUNCTURE: CPT | Performed by: CLINICAL NURSE SPECIALIST

## 2018-01-22 PROCEDURE — 99211 OFF/OP EST MAY X REQ PHY/QHP: CPT | Performed by: CLINICAL NURSE SPECIALIST

## 2018-01-22 NOTE — PATIENT INSTRUCTIONS
Please begin taking bumex 0.5 mg and Entresto first thing in the am when you wake up. Please continue taking your coreg 3.125 mg twice daily with meals. Try to space out these medications in the morning by at least 2 hours.  Please take your second dose of

## 2018-01-22 NOTE — PROGRESS NOTES
[address] [place] [address] Patient Status:  Outpatient    10/30/1935 MRN [id number]   Location MD Dr Ray [address] is a [de-identified]year old male who presents to i BILT 1.0 09/30/2017 08:11 AM   TP 7.4 09/30/2017 08:11 AM   AST 21 09/30/2017 08:11 AM   ALT 16 (L) 09/30/2017 08:11 AM   TSH 2.49 01/08/2018 09:26 AM   MG 2.1 01/13/2018 05:11 AM   TROP 0.06 (HH) 01/08/2018 03:24 PM   PGLU 139 (H) 01/16/2018 12:09 PM and lightheadedness. Did not take his am dose of coreg or Entresto because they make him too dizzy when he takes them together. He has been on both of these medications for several months.  States he is able to take [place] Maw first thing in the am with his b

## 2018-01-26 ENCOUNTER — OFFICE VISIT (OUTPATIENT)
Dept: CARDIOLOGY CLINIC | Facility: HOSPITAL | Age: 83
End: 2018-01-26
Attending: INTERNAL MEDICINE
Payer: MEDICARE

## 2018-01-26 VITALS
OXYGEN SATURATION: 99 % | SYSTOLIC BLOOD PRESSURE: 96 MMHG | WEIGHT: 160 LBS | HEART RATE: 81 BPM | BODY MASS INDEX: 25 KG/M2 | DIASTOLIC BLOOD PRESSURE: 59 MMHG

## 2018-01-26 DIAGNOSIS — I50.9 HEART FAILURE, UNSPECIFIED (HCC): Primary | ICD-10-CM

## 2018-01-26 DIAGNOSIS — I50.22 CHRONIC SYSTOLIC HEART FAILURE (HCC): ICD-10-CM

## 2018-01-26 LAB
ANION GAP SERPL CALC-SCNC: 11 MMOL/L (ref 0–18)
BUN SERPL-MCNC: 40 MG/DL (ref 8–20)
BUN/CREAT SERPL: 16.3 (ref 10–20)
CALCIUM SERPL-MCNC: 8.7 MG/DL (ref 8.5–10.5)
CHLORIDE SERPL-SCNC: 101 MMOL/L (ref 95–110)
CO2 SERPL-SCNC: 26 MMOL/L (ref 22–32)
CREAT SERPL-MCNC: 2.45 MG/DL (ref 0.5–1.5)
GLUCOSE SERPL-MCNC: 128 MG/DL (ref 70–99)
OSMOLALITY UR CALC.SUM OF ELEC: 297 MOSM/KG (ref 275–295)
POTASSIUM SERPL-SCNC: 4.1 MMOL/L (ref 3.3–5.1)
SODIUM SERPL-SCNC: 138 MMOL/L (ref 136–144)

## 2018-01-26 PROCEDURE — 99214 OFFICE O/P EST MOD 30 MIN: CPT | Performed by: CLINICAL NURSE SPECIALIST

## 2018-01-26 PROCEDURE — 80048 BASIC METABOLIC PNL TOTAL CA: CPT | Performed by: CLINICAL NURSE SPECIALIST

## 2018-01-26 PROCEDURE — 99211 OFF/OP EST MAY X REQ PHY/QHP: CPT | Performed by: CLINICAL NURSE SPECIALIST

## 2018-01-26 PROCEDURE — 36415 COLL VENOUS BLD VENIPUNCTURE: CPT | Performed by: CLINICAL NURSE SPECIALIST

## 2018-01-26 NOTE — PROGRESS NOTES
[facility] Patient Status:  Outpatient    10/30/1935 MRN [id number]   Location MD Dr Angelo Umana Dr is a [de-identified]year old male who presents to i TP 7.4 09/30/2017 08:11 AM   AST 21 09/30/2017 08:11 AM   ALT 16 (L) 09/30/2017 08:11 AM   TSH 2.49 01/08/2018 09:26 AM   MG 2.1 01/13/2018 05:11 AM   TROP 0.06 (HH) 01/08/2018 03:24 PM   PGLU 139 (H) 01/16/2018 12:09 PM       Clinical labs drawn by MA: lbs), however his symptoms have improved and his edema is less. It is unclear if he took extra bumex as requested. He states he did \"once or twice\" this week, but then back tracks and said he did not yesterday. Despite this, he is feeling better.      BMP

## 2018-01-26 NOTE — PATIENT INSTRUCTIONS
Continue current medications    Continue tracking daily weights. Call with weight gain of 3 lbs overnight or concerning symptoms. [phone]    32-64 oz fluid restriction    Less than 2000 mg sodium/salt diet.  Common high sodium foods include frozen di

## 2018-01-30 ENCOUNTER — APPOINTMENT (OUTPATIENT)
Dept: CT IMAGING | Facility: HOSPITAL | Age: 83
End: 2018-01-30
Attending: EMERGENCY MEDICINE
Payer: MEDICARE

## 2018-01-30 ENCOUNTER — HOSPITAL ENCOUNTER (OUTPATIENT)
Facility: HOSPITAL | Age: 83
Setting detail: OBSERVATION
LOS: 1 days | Discharge: NURSING, REHAB OR LONG-TERM CARE | End: 2018-02-01
Attending: EMERGENCY MEDICINE | Admitting: HOSPITALIST
Payer: MEDICARE

## 2018-01-30 ENCOUNTER — APPOINTMENT (OUTPATIENT)
Dept: GENERAL RADIOLOGY | Facility: HOSPITAL | Age: 83
End: 2018-01-30
Attending: EMERGENCY MEDICINE
Payer: MEDICARE

## 2018-01-30 DIAGNOSIS — R53.83 FATIGUE, UNSPECIFIED TYPE: Primary | ICD-10-CM

## 2018-01-30 DIAGNOSIS — I42.9 CARDIOMYOPATHY, UNSPECIFIED TYPE (HCC): ICD-10-CM

## 2018-01-30 DIAGNOSIS — R06.00 DYSPNEA, UNSPECIFIED TYPE: ICD-10-CM

## 2018-01-30 DIAGNOSIS — R77.8 ELEVATED TROPONIN: ICD-10-CM

## 2018-01-30 LAB
ANION GAP SERPL CALC-SCNC: 10 MMOL/L (ref 0–18)
BASOPHILS # BLD: 0 K/UL (ref 0–0.2)
BASOPHILS NFR BLD: 0 %
BILIRUB UR QL: NEGATIVE
BNP SERPL-MCNC: 5690 PG/ML (ref 0–100)
BUN SERPL-MCNC: 41 MG/DL (ref 8–20)
BUN/CREAT SERPL: 16.3 (ref 10–20)
CALCIUM SERPL-MCNC: 9.3 MG/DL (ref 8.5–10.5)
CHLORIDE SERPL-SCNC: 105 MMOL/L (ref 95–110)
CHOLEST SERPL-MCNC: 124 MG/DL (ref 110–200)
CLARITY UR: CLEAR
CO2 SERPL-SCNC: 26 MMOL/L (ref 22–32)
COLOR UR: YELLOW
CREAT SERPL-MCNC: 2.51 MG/DL (ref 0.5–1.5)
EOSINOPHIL # BLD: 0 K/UL (ref 0–0.7)
EOSINOPHIL NFR BLD: 1 %
ERYTHROCYTE [DISTWIDTH] IN BLOOD BY AUTOMATED COUNT: 16.3 % (ref 11–15)
GLUCOSE BLDC GLUCOMTR-MCNC: 156 MG/DL (ref 70–99)
GLUCOSE SERPL-MCNC: 154 MG/DL (ref 70–99)
GLUCOSE UR-MCNC: NEGATIVE MG/DL
HCT VFR BLD AUTO: 35.9 % (ref 41–52)
HDLC SERPL-MCNC: 49 MG/DL
HGB BLD-MCNC: 11.7 G/DL (ref 13.5–17.5)
HGB UR QL STRIP.AUTO: NEGATIVE
KETONES UR-MCNC: NEGATIVE MG/DL
LDLC SERPL CALC-MCNC: 61 MG/DL (ref 0–99)
LEUKOCYTE ESTERASE UR QL STRIP.AUTO: NEGATIVE
LYMPHOCYTES # BLD: 0.4 K/UL (ref 1–4)
LYMPHOCYTES NFR BLD: 6 %
MCH RBC QN AUTO: 29 PG (ref 27–32)
MCHC RBC AUTO-ENTMCNC: 32.7 G/DL (ref 32–37)
MCV RBC AUTO: 88.6 FL (ref 80–100)
MONOCYTES # BLD: 0.5 K/UL (ref 0–1)
MONOCYTES NFR BLD: 8 %
NEUTROPHILS # BLD AUTO: 5.5 K/UL (ref 1.8–7.7)
NEUTROPHILS NFR BLD: 85 %
NITRITE UR QL STRIP.AUTO: NEGATIVE
NONHDLC SERPL-MCNC: 75 MG/DL
OSMOLALITY UR CALC.SUM OF ELEC: 305 MOSM/KG (ref 275–295)
PH UR: 6 [PH] (ref 5–8)
PLATELET # BLD AUTO: 154 K/UL (ref 140–400)
PMV BLD AUTO: 10.9 FL (ref 7.4–10.3)
POTASSIUM SERPL-SCNC: 4.4 MMOL/L (ref 3.3–5.1)
PROT UR-MCNC: NEGATIVE MG/DL
RBC # BLD AUTO: 4.05 M/UL (ref 4.5–5.9)
SODIUM SERPL-SCNC: 141 MMOL/L (ref 136–144)
SP GR UR STRIP: 1.01 (ref 1–1.03)
TRIGL SERPL-MCNC: 70 MG/DL (ref 1–149)
TROPONIN I SERPL-MCNC: 0.06 NG/ML (ref ?–0.03)
TROPONIN I SERPL-MCNC: 0.06 NG/ML (ref ?–0.03)
UROBILINOGEN UR STRIP-ACNC: <2
VIT C UR-MCNC: NEGATIVE MG/DL
WBC # BLD AUTO: 6.5 K/UL (ref 4–11)

## 2018-01-30 PROCEDURE — 99285 EMERGENCY DEPT VISIT HI MDM: CPT

## 2018-01-30 PROCEDURE — 82962 GLUCOSE BLOOD TEST: CPT

## 2018-01-30 PROCEDURE — 84484 ASSAY OF TROPONIN QUANT: CPT | Performed by: EMERGENCY MEDICINE

## 2018-01-30 PROCEDURE — 71045 X-RAY EXAM CHEST 1 VIEW: CPT | Performed by: EMERGENCY MEDICINE

## 2018-01-30 PROCEDURE — 70450 CT HEAD/BRAIN W/O DYE: CPT | Performed by: EMERGENCY MEDICINE

## 2018-01-30 PROCEDURE — 80061 LIPID PANEL: CPT | Performed by: EMERGENCY MEDICINE

## 2018-01-30 PROCEDURE — 93010 ELECTROCARDIOGRAM REPORT: CPT | Performed by: EMERGENCY MEDICINE

## 2018-01-30 PROCEDURE — 36415 COLL VENOUS BLD VENIPUNCTURE: CPT

## 2018-01-30 PROCEDURE — 81003 URINALYSIS AUTO W/O SCOPE: CPT | Performed by: EMERGENCY MEDICINE

## 2018-01-30 PROCEDURE — 85025 COMPLETE CBC W/AUTO DIFF WBC: CPT | Performed by: EMERGENCY MEDICINE

## 2018-01-30 PROCEDURE — 83880 ASSAY OF NATRIURETIC PEPTIDE: CPT | Performed by: EMERGENCY MEDICINE

## 2018-01-30 PROCEDURE — 93005 ELECTROCARDIOGRAM TRACING: CPT

## 2018-01-30 PROCEDURE — 80048 BASIC METABOLIC PNL TOTAL CA: CPT | Performed by: EMERGENCY MEDICINE

## 2018-01-30 RX ORDER — FEXOFENADINE HCL 180 MG/1
180 TABLET ORAL DAILY
COMMUNITY

## 2018-01-30 RX ORDER — PRAVASTATIN SODIUM 20 MG
40 TABLET ORAL NIGHTLY
Status: DISCONTINUED | OUTPATIENT
Start: 2018-01-30 | End: 2018-02-01

## 2018-01-30 RX ORDER — ZOLPIDEM TARTRATE 10 MG/1
10 TABLET ORAL NIGHTLY PRN
Status: DISCONTINUED | OUTPATIENT
Start: 2018-01-30 | End: 2018-02-01

## 2018-01-30 RX ORDER — CARVEDILOL 3.12 MG/1
3.12 TABLET ORAL 2 TIMES DAILY WITH MEALS
Status: DISCONTINUED | OUTPATIENT
Start: 2018-01-30 | End: 2018-02-01

## 2018-01-30 RX ORDER — TRAMADOL HYDROCHLORIDE 50 MG/1
50 TABLET ORAL EVERY 12 HOURS PRN
Status: DISCONTINUED | OUTPATIENT
Start: 2018-01-30 | End: 2018-02-01

## 2018-01-30 RX ORDER — ONDANSETRON 2 MG/ML
4 INJECTION INTRAMUSCULAR; INTRAVENOUS EVERY 6 HOURS PRN
Status: DISCONTINUED | OUTPATIENT
Start: 2018-01-30 | End: 2018-02-01

## 2018-01-30 RX ORDER — FEXOFENADINE HCL 180 MG/1
180 TABLET ORAL DAILY
Status: DISCONTINUED | OUTPATIENT
Start: 2018-01-30 | End: 2018-01-30

## 2018-01-30 RX ORDER — SODIUM CHLORIDE 0.9 % (FLUSH) 0.9 %
3 SYRINGE (ML) INJECTION AS NEEDED
Status: DISCONTINUED | OUTPATIENT
Start: 2018-01-30 | End: 2018-02-01

## 2018-01-30 RX ORDER — LORAZEPAM 0.5 MG/1
0.5 TABLET ORAL EVERY 6 HOURS PRN
Status: DISCONTINUED | OUTPATIENT
Start: 2018-01-30 | End: 2018-02-01

## 2018-01-30 RX ORDER — BUMETANIDE 1 MG/1
0.5 TABLET ORAL DAILY
Status: DISCONTINUED | OUTPATIENT
Start: 2018-01-31 | End: 2018-01-31

## 2018-01-30 RX ORDER — CETIRIZINE HYDROCHLORIDE 10 MG/1
5 TABLET ORAL DAILY
Status: DISCONTINUED | OUTPATIENT
Start: 2018-01-30 | End: 2018-01-31

## 2018-01-30 RX ORDER — ALLOPURINOL 100 MG/1
100 TABLET ORAL
Status: DISCONTINUED | OUTPATIENT
Start: 2018-01-31 | End: 2018-02-01

## 2018-01-30 RX ORDER — ACETAMINOPHEN 325 MG/1
650 TABLET ORAL EVERY 6 HOURS PRN
Status: DISCONTINUED | OUTPATIENT
Start: 2018-01-30 | End: 2018-02-01

## 2018-01-30 RX ORDER — SUCRALFATE 1 G/1
1 TABLET ORAL AS NEEDED
Status: DISCONTINUED | OUTPATIENT
Start: 2018-01-30 | End: 2018-02-01

## 2018-01-30 RX ORDER — BUMETANIDE 0.25 MG/ML
1 INJECTION, SOLUTION INTRAMUSCULAR; INTRAVENOUS ONCE
Status: COMPLETED | OUTPATIENT
Start: 2018-01-30 | End: 2018-01-31

## 2018-01-30 RX ORDER — PANTOPRAZOLE SODIUM 40 MG/1
40 TABLET, DELAYED RELEASE ORAL
Status: DISCONTINUED | OUTPATIENT
Start: 2018-01-31 | End: 2018-02-01

## 2018-01-30 RX ORDER — DEXTROSE MONOHYDRATE 25 G/50ML
50 INJECTION, SOLUTION INTRAVENOUS AS NEEDED
Status: DISCONTINUED | OUTPATIENT
Start: 2018-01-30 | End: 2018-02-01

## 2018-01-30 RX ORDER — CLOPIDOGREL BISULFATE 75 MG/1
75 TABLET ORAL DAILY
Status: DISCONTINUED | OUTPATIENT
Start: 2018-01-31 | End: 2018-02-01

## 2018-01-30 RX ORDER — DIGOXIN 125 MCG
125 TABLET ORAL DAILY
Status: DISCONTINUED | OUTPATIENT
Start: 2018-01-31 | End: 2018-02-01

## 2018-01-30 NOTE — PROGRESS NOTES
[facility] HOSP - [facility]    Cardiology Consultation  [facility]    Eusebia Tunisian Patient Status:  Emergency    10/30/1935 MRN [id number]   Location [address] Attending Judah Hughes MD   [facility] on file    Social History Narrative    None on file            Current Medications:    No current facility-administered medications for this encounter.      (Not in a hospital admission)    Allergies    Seasonal                Runny nose    Review of System TROP 0.06 () 01/30/2018         Imaging:  Ct Brain Or Head ([id number])    Result Date: 1/30/2018  CONCLUSION:  1. No acute intracranial process by noncontrast CT technique. 2. 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

## 2018-01-30 NOTE — ED PROVIDER NOTES
Patient Seen in: [facility] AND [facility] Emergency Department    History   Patient presents with:  Fatigue (constitutional, neurologic)    Stated Complaint: fatigue    HPI    Patient presents because he has been feeling tired for the past few days.   He has rec Oral Tab,  Take 650 mg by mouth every 6 (six) hours as needed for Pain. apixaban 2.5 MG Oral Tab,  Take 2.5 mg by mouth 2 (two) times daily.    Glucose Blood (GLUCOCARD VITAL TEST) In Vitro Strip,  Testing twice daily   ENTRESTO 24-26 MG Oral Tab,  Take 1 °C)  Temp src: Oral  SpO2: 100 %  O2 Device: None (Room air)    Current:/82   Pulse 79   Temp (!) 96.7 °F (35.9 °C) (Oral)   Resp 19   Ht 170.2 cm (5' 7\")   Wt 69.4 kg   SpO2 99%   BMI 23.96 kg/m²         Physical Exam  Constitutional:  Alert, well significant underlying disease I have paged his hospitalist for admission. I did renotify cardiologist.  Dr. Monica Munguia notified.     ED Course     Labs Reviewed   BASIC METABOLIC PANEL (8) - Abnormal; Notable for the following:        Result Value    Gluco oximetry    Cardiac Monitor: Paced rhythm    Disposition and Plan     We recommend that you schedule follow up care with a primary care provider within the next three months to obtain basic health screening including reassessment of your blood pressure.

## 2018-01-30 NOTE — H&P
[facility] Hospitalist H&P       CC: Patient presents with:  Fatigue (constitutional, neurologic)     PCP: Marcella De Anda MD    ASSESSMENT / PLAN:     Mr. Anselmo Rosenthal is an [de-identified] yo male with hx chronic systolic chf, ICMP S/P ICD, atrial fibrillation, CKD stage in house    Patient and/or patient's family given opportunity to ask questions and note understanding and agreeing with therapeutic plan as outlined    Brittany Tovar MD  [facility] Hospitalist  Answering Service number: [phone]    HPI       History of Pr for what's stated above.      OBJECTIVE:  /82   Pulse 71   Temp (!) 96.7 °F (35.9 °C) (Oral)   Resp 19   Ht 170.2 cm (5' 7\")   Wt 153 lb (69.4 kg)   SpO2 97%   BMI 23.96 kg/m²     GEN: elderly male in NAD  HEENT: EOMI, PERRLA  Neck: Supple, +JVD to a central pulmonary vascular congestion, similar in comparison to 1/8/2018.

## 2018-01-30 NOTE — HISTORICAL OFFICE NOTE
JAZMYNE REINA  : 10/30/1935  ACCOUNT:  [id number]  [phone]  PCP: Dr. Nichelle Delgado DATE: 2017  DICTATED BY:  [Dr. Robbin Adler: [Followup of CAD, of native vessels.]    HPI:    [On 2017, [employer] [facility], a [de-identified] HEM/LYMPH: easy bruising. ALL: no new food or enviornmental allergies.       PAST HISTORY: diabetes, gastritis, mini- stroke, kidney problem, anxiety, depression, hip replacement and 2005    PAST CV HISTORY: bypass x 2 , mitral valve replacement 2015, hyper low likelihood of success in high risk in his case and therefore is to be left with permanent atrial fibrillation questionable whether asymptomatic.   2.  Chronic systolic congestive heart failure with EF 10% difficult to treat on medical therapy follows wi 40MG      1 tab at [facility]                              09/27/17 Protonix              40MG      1 tab at AM and 1 tab at [facility]              09/27/17 Sucralfate            1GM       twice daily                              09/27/17 Tradjenta             5MG

## 2018-01-31 LAB
ADENOVIRUS PCR:: NEGATIVE
ANION GAP SERPL CALC-SCNC: 11 MMOL/L (ref 0–18)
B PERT DNA SPEC QL NAA+PROBE: NEGATIVE
BASOPHILS # BLD: 0 K/UL (ref 0–0.2)
BASOPHILS NFR BLD: 1 %
BUN SERPL-MCNC: 40 MG/DL (ref 8–20)
BUN/CREAT SERPL: 16.9 (ref 10–20)
C PNEUM DNA SPEC QL NAA+PROBE: NEGATIVE
CALCIUM SERPL-MCNC: 9.2 MG/DL (ref 8.5–10.5)
CHLORIDE SERPL-SCNC: 106 MMOL/L (ref 95–110)
CO2 SERPL-SCNC: 24 MMOL/L (ref 22–32)
CORONAVIRUS 229E PCR:: NEGATIVE
CORONAVIRUS HKU1 PCR:: NEGATIVE
CORONAVIRUS NL63 PCR:: NEGATIVE
CORONAVIRUS OC43 PCR:: NEGATIVE
CREAT SERPL-MCNC: 2.36 MG/DL (ref 0.5–1.5)
EOSINOPHIL # BLD: 0.1 K/UL (ref 0–0.7)
EOSINOPHIL NFR BLD: 1 %
ERYTHROCYTE [DISTWIDTH] IN BLOOD BY AUTOMATED COUNT: 16.6 % (ref 11–15)
FLUAV RNA SPEC QL NAA+PROBE: NEGATIVE
FLUBV RNA SPEC QL NAA+PROBE: NEGATIVE
GLUCOSE BLDC GLUCOMTR-MCNC: 125 MG/DL (ref 70–99)
GLUCOSE BLDC GLUCOMTR-MCNC: 138 MG/DL (ref 70–99)
GLUCOSE BLDC GLUCOMTR-MCNC: 143 MG/DL (ref 70–99)
GLUCOSE BLDC GLUCOMTR-MCNC: 178 MG/DL (ref 70–99)
GLUCOSE SERPL-MCNC: 140 MG/DL (ref 70–99)
HBA1C MFR BLD: 6.9 % (ref 4–6)
HCT VFR BLD AUTO: 35 % (ref 41–52)
HGB BLD-MCNC: 11.6 G/DL (ref 13.5–17.5)
LYMPHOCYTES # BLD: 0.5 K/UL (ref 1–4)
LYMPHOCYTES NFR BLD: 8 %
MCH RBC QN AUTO: 29 PG (ref 27–32)
MCHC RBC AUTO-ENTMCNC: 33 G/DL (ref 32–37)
MCV RBC AUTO: 87.7 FL (ref 80–100)
METAPNEUMOVIRUS PCR:: NEGATIVE
MONOCYTES # BLD: 0.7 K/UL (ref 0–1)
MONOCYTES NFR BLD: 10 %
MYCOPLASMA PNEUMONIA PCR:: NEGATIVE
NEUTROPHILS # BLD AUTO: 5.2 K/UL (ref 1.8–7.7)
NEUTROPHILS NFR BLD: 80 %
OSMOLALITY UR CALC.SUM OF ELEC: 304 MOSM/KG (ref 275–295)
PARAINFLUENZA 1 PCR:: NEGATIVE
PARAINFLUENZA 2 PCR:: NEGATIVE
PARAINFLUENZA 3 PCR:: NEGATIVE
PARAINFLUENZA 4 PCR:: NEGATIVE
PLATELET # BLD AUTO: 120 K/UL (ref 140–400)
PMV BLD AUTO: 9.7 FL (ref 7.4–10.3)
POTASSIUM SERPL-SCNC: 3.8 MMOL/L (ref 3.3–5.1)
RBC # BLD AUTO: 3.99 M/UL (ref 4.5–5.9)
RHINOVIRUS/ENTERO PCR:: NEGATIVE
RSV RNA SPEC QL NAA+PROBE: NEGATIVE
SODIUM SERPL-SCNC: 141 MMOL/L (ref 136–144)
WBC # BLD AUTO: 6.6 K/UL (ref 4–11)

## 2018-01-31 PROCEDURE — 97165 OT EVAL LOW COMPLEX 30 MIN: CPT

## 2018-01-31 PROCEDURE — 82962 GLUCOSE BLOOD TEST: CPT

## 2018-01-31 PROCEDURE — 87798 DETECT AGENT NOS DNA AMP: CPT | Performed by: HOSPITALIST

## 2018-01-31 PROCEDURE — 97116 GAIT TRAINING THERAPY: CPT

## 2018-01-31 PROCEDURE — 80048 BASIC METABOLIC PNL TOTAL CA: CPT | Performed by: HOSPITALIST

## 2018-01-31 PROCEDURE — 83036 HEMOGLOBIN GLYCOSYLATED A1C: CPT | Performed by: HOSPITALIST

## 2018-01-31 PROCEDURE — 96376 TX/PRO/DX INJ SAME DRUG ADON: CPT

## 2018-01-31 PROCEDURE — 87486 CHLMYD PNEUM DNA AMP PROBE: CPT | Performed by: HOSPITALIST

## 2018-01-31 PROCEDURE — 96374 THER/PROPH/DIAG INJ IV PUSH: CPT

## 2018-01-31 PROCEDURE — 87581 M.PNEUMON DNA AMP PROBE: CPT | Performed by: HOSPITALIST

## 2018-01-31 PROCEDURE — 87633 RESP VIRUS 12-25 TARGETS: CPT | Performed by: HOSPITALIST

## 2018-01-31 PROCEDURE — 97166 OT EVAL MOD COMPLEX 45 MIN: CPT

## 2018-01-31 PROCEDURE — A4216 STERILE WATER/SALINE, 10 ML: HCPCS | Performed by: HOSPITALIST

## 2018-01-31 PROCEDURE — 97161 PT EVAL LOW COMPLEX 20 MIN: CPT

## 2018-01-31 PROCEDURE — 85025 COMPLETE CBC W/AUTO DIFF WBC: CPT | Performed by: HOSPITALIST

## 2018-01-31 RX ORDER — BUMETANIDE 0.25 MG/ML
2 INJECTION, SOLUTION INTRAMUSCULAR; INTRAVENOUS
Status: DISCONTINUED | OUTPATIENT
Start: 2018-01-31 | End: 2018-02-01

## 2018-01-31 RX ORDER — BUMETANIDE 0.25 MG/ML
1 INJECTION, SOLUTION INTRAMUSCULAR; INTRAVENOUS DAILY
Status: DISCONTINUED | OUTPATIENT
Start: 2018-01-31 | End: 2018-01-31

## 2018-01-31 RX ORDER — BUMETANIDE 0.25 MG/ML
1 INJECTION, SOLUTION INTRAMUSCULAR; INTRAVENOUS ONCE
Status: COMPLETED | OUTPATIENT
Start: 2018-01-31 | End: 2018-01-31

## 2018-01-31 RX ORDER — POTASSIUM CHLORIDE 20 MEQ/1
20 TABLET, EXTENDED RELEASE ORAL ONCE
Status: COMPLETED | OUTPATIENT
Start: 2018-01-31 | End: 2018-01-31

## 2018-01-31 RX ORDER — BUMETANIDE 0.25 MG/ML
1 INJECTION, SOLUTION INTRAMUSCULAR; INTRAVENOUS ONCE
Status: DISCONTINUED | OUTPATIENT
Start: 2018-01-31 | End: 2018-01-31

## 2018-01-31 NOTE — CM/SW NOTE
Patient failed inpatient criteria. Second level of review completed and supports observation. UR committee in agreement. Discussed with Dr. Gillian aPinter who approves observation status. Observation letter given to patient and order written.

## 2018-01-31 NOTE — PROGRESS NOTES
[facility]    Cardiology Progress Note    Kirstin Lewis Patient Status:  Observation    10/30/1935 MRN [id number]   Location [facility] 3W/SW Attending Gila Cisneros MD   Hosp Day # 0 PCP Boy Finley MD     Primary Cardiolo elevated Cr.   Preload: IVP Bumex  Device: [employer] ICD    Afib  -1/10/17 S/P RFA of AV Node  -eliquis    CAD S/P CABG - stable on plavix and statin (no asa while on eliquis)    CKD stage 3 - ctm closely, creatinine elevated, suspect prerenal    S/P MVR - ventricular pacing, PVC Diffuse low voltage.   - Extensive anterior-lateral infarct (age undetermined) - ABNORMAL When compared with ECG of 01/30/2018 12:27:01 Pacing is only intermittent Electronically signed on 01/30/2018 at 14:32 by Tom Santiago MD

## 2018-01-31 NOTE — PHYSICAL THERAPY NOTE
Chart reviewed, RN approved PT eval, however upon entering, pt c/o severe fatigue, SOB, drooling, having difficulty keeping eyes open, and simply exhausted.   RN states he has been complaining of these things, however does get up to walk around room with he

## 2018-01-31 NOTE — PROGRESS NOTES
[facility] Hospitalist Progress Note     CC: Hospital Follow up    PCP: Fernando Lang MD       Assessment/Plan:     Principal Problem:    Fatigue, unspecified type  Active Problems:    Elevated troponin    Fatigue    Cardiomyopathy, unspecified type ([address] with patient     Dispo: pending clinical course    Questions/concerns were discussed with patient and/or family by bedside.     Thank Clair Castro MD    [facility] Hospitalist     Subjective:     Feels fatigued, generally weak, no chest pain, but notes orthopne hours.    Invalid input(s): ALPHOS, TBIL, DBIL, TPROT      Imaging:  Ct Brain Or Head ([id number])    Result Date: 1/30/2018  CONCLUSION:  1. No acute intracranial process by noncontrast CT technique.  2. Chronic right frontal lobe (right middle cerebral artery

## 2018-01-31 NOTE — PROGRESS NOTES
Patient c/o of shortness of breath, SpO2 level was stable. Rn explained to him and educated him about CHF. Then patient agreed to take him his one time dose of iv BUMEX. Rn administered iv Bumex.

## 2018-01-31 NOTE — PROGRESS NOTES
Kirstin Lewis was previously taking fexofenadine (Allegra) 180 mg PO q24h at home prior to admission. Per therapeutic interchange, the patient will be switched to cetirizine (Zyrtec) 10 mg PO q24h during hospitalization. The patient will be able to switch ba

## 2018-01-31 NOTE — PHYSICAL THERAPY NOTE
PHYSICAL THERAPY EVALUATION - INPATIENT     Room Number: 318/318-A  Evaluation Date: 1/31/2018  Type of Evaluation:  New  Physician Order: PT Eval and Treat    Presenting Problem: weakness  Reason for Therapy: Mobility Dysfunction and Discharge Plannin secondary to diabetes [facility])    • Coronary atherosclerosis    • Depression    • Diabetes ([facility] [address]    • Esophageal reflux    • Gout    • Heart attack    • High blood pressure    • High cholesterol    • Neuropathy    • Renal disorder     chronic kidney disease but unable to read. RN informed. AM-PAC '6-Clicks' INPATIENT SHORT FORM - BASIC MOBILITY  How much difficulty does the patient currently have. ..  -   Turning over in bed (including adjusting bedclothes, sheets and blankets)?: None   -   Sitting down on

## 2018-01-31 NOTE — CM/SW NOTE
3:25pm Update- CIARA spoke with MD who is aware of the Observation review. Pt had recently been at [facility] 1/8/18-1/16/18, so he would already have met the criteria for snf placement. Update given to [facility].  Referral sent via allscripts and call placed

## 2018-01-31 NOTE — PLAN OF CARE
Patient refused to take one time dose of iv  Bumex. Try to educate him, but he said, that he is already dehydrated.

## 2018-01-31 NOTE — PROGRESS NOTES
[facility] Pharmacy Note:  Renal Dose Adjustment for Tramadol Fabricio Matthew)    Bernardo Null has been prescribed Tramadol (ULTRAM) 50 mg orally every 6 hours as needed for pain. Estimated Creatinine Clearance: 21.9 mL/min (based on SCr of 2.51 mg/dL (H)).     His mohini

## 2018-01-31 NOTE — OCCUPATIONAL THERAPY NOTE
OCCUPATIONAL THERAPY EVALUATION - INPATIENT     Room Number: 318/318-A  Evaluation Date: 1/31/2018  Type of Evaluation: Initial  Presenting Problem:  (fatigue)    Physician Order: IP Consult to Occupational Therapy  Reason for Therapy: ADL/IADL Dysfunction eval/education         OCCUPATIONAL THERAPY MEDICAL/SOCIAL HISTORY     Problem List  Principal Problem:    Fatigue, unspecified type  Active Problems:    Elevated troponin    Fatigue    Cardiomyopathy, unspecified type (HCC)    Dyspnea, unspecified type SOB    COGNITION  Intact    Behavioral/Emotional/Social: Anxious at times but pleasant    RANGE OF MOTION   Upper extremity ROM is within functional limits  STRENGTH ASSESSMENT  Upper extremity strength is within functional limits       ACTIVITIES OF DAILY

## 2018-02-01 ENCOUNTER — PRIOR ORIGINAL RECORDS (OUTPATIENT)
Dept: OTHER | Age: 83
End: 2018-02-01

## 2018-02-01 VITALS
WEIGHT: 132.63 LBS | SYSTOLIC BLOOD PRESSURE: 100 MMHG | DIASTOLIC BLOOD PRESSURE: 56 MMHG | HEART RATE: 73 BPM | OXYGEN SATURATION: 96 % | TEMPERATURE: 98 F | RESPIRATION RATE: 18 BRPM | BODY MASS INDEX: 20.82 KG/M2 | HEIGHT: 67 IN

## 2018-02-01 LAB
ANION GAP SERPL CALC-SCNC: 9 MMOL/L (ref 0–18)
BASOPHILS # BLD: 0 K/UL (ref 0–0.2)
BASOPHILS NFR BLD: 1 %
BUN SERPL-MCNC: 43 MG/DL (ref 8–20)
BUN/CREAT SERPL: 19.5 (ref 10–20)
CALCIUM SERPL-MCNC: 8.9 MG/DL (ref 8.5–10.5)
CHLORIDE SERPL-SCNC: 103 MMOL/L (ref 95–110)
CO2 SERPL-SCNC: 28 MMOL/L (ref 22–32)
CREAT SERPL-MCNC: 2.21 MG/DL (ref 0.5–1.5)
EOSINOPHIL # BLD: 0.1 K/UL (ref 0–0.7)
EOSINOPHIL NFR BLD: 2 %
ERYTHROCYTE [DISTWIDTH] IN BLOOD BY AUTOMATED COUNT: 16.1 % (ref 11–15)
GLUCOSE BLDC GLUCOMTR-MCNC: 127 MG/DL (ref 70–99)
GLUCOSE BLDC GLUCOMTR-MCNC: 134 MG/DL (ref 70–99)
GLUCOSE SERPL-MCNC: 130 MG/DL (ref 70–99)
HCT VFR BLD AUTO: 34.9 % (ref 41–52)
HGB BLD-MCNC: 11.4 G/DL (ref 13.5–17.5)
LYMPHOCYTES # BLD: 0.5 K/UL (ref 1–4)
LYMPHOCYTES NFR BLD: 7 %
MAGNESIUM SERPL-MCNC: 1.8 MG/DL (ref 1.8–2.5)
MCH RBC QN AUTO: 28.8 PG (ref 27–32)
MCHC RBC AUTO-ENTMCNC: 32.8 G/DL (ref 32–37)
MCV RBC AUTO: 87.8 FL (ref 80–100)
MONOCYTES # BLD: 0.6 K/UL (ref 0–1)
MONOCYTES NFR BLD: 10 %
NEUTROPHILS # BLD AUTO: 5.1 K/UL (ref 1.8–7.7)
NEUTROPHILS NFR BLD: 80 %
OSMOLALITY UR CALC.SUM OF ELEC: 303 MOSM/KG (ref 275–295)
PLATELET # BLD AUTO: 113 K/UL (ref 140–400)
PMV BLD AUTO: 9.7 FL (ref 7.4–10.3)
POTASSIUM SERPL-SCNC: 3.6 MMOL/L (ref 3.3–5.1)
POTASSIUM SERPL-SCNC: 3.6 MMOL/L (ref 3.3–5.1)
RBC # BLD AUTO: 3.97 M/UL (ref 4.5–5.9)
SODIUM SERPL-SCNC: 140 MMOL/L (ref 136–144)
WBC # BLD AUTO: 6.3 K/UL (ref 4–11)

## 2018-02-01 PROCEDURE — 82962 GLUCOSE BLOOD TEST: CPT

## 2018-02-01 PROCEDURE — 84132 ASSAY OF SERUM POTASSIUM: CPT | Performed by: HOSPITALIST

## 2018-02-01 PROCEDURE — 83735 ASSAY OF MAGNESIUM: CPT | Performed by: HOSPITALIST

## 2018-02-01 PROCEDURE — 80048 BASIC METABOLIC PNL TOTAL CA: CPT | Performed by: HOSPITALIST

## 2018-02-01 PROCEDURE — 85025 COMPLETE CBC W/AUTO DIFF WBC: CPT | Performed by: HOSPITALIST

## 2018-02-01 RX ORDER — POTASSIUM CHLORIDE 20 MEQ/1
40 TABLET, EXTENDED RELEASE ORAL ONCE
Status: COMPLETED | OUTPATIENT
Start: 2018-02-01 | End: 2018-02-01

## 2018-02-01 RX ORDER — TRAMADOL HYDROCHLORIDE 50 MG/1
50 TABLET ORAL EVERY 12 HOURS PRN
Qty: 30 TABLET | Refills: 0 | Status: SHIPPED | OUTPATIENT
Start: 2018-02-01 | End: 2018-07-20

## 2018-02-01 RX ORDER — BUMETANIDE 1 MG/1
1 TABLET ORAL NIGHTLY
Status: DISCONTINUED | OUTPATIENT
Start: 2018-02-01 | End: 2018-02-01

## 2018-02-01 RX ORDER — BUMETANIDE 2 MG/1
2 TABLET ORAL DAILY
Qty: 30 TABLET | Refills: 0 | Status: SHIPPED | OUTPATIENT
Start: 2018-02-02 | End: 2018-04-16 | Stop reason: ALTCHOICE

## 2018-02-01 RX ORDER — BUMETANIDE 1 MG/1
2 TABLET ORAL DAILY
Status: DISCONTINUED | OUTPATIENT
Start: 2018-02-02 | End: 2018-02-01

## 2018-02-01 RX ORDER — BUMETANIDE 1 MG/1
1 TABLET ORAL NIGHTLY
Qty: 30 TABLET | Refills: 0 | Status: SHIPPED | OUTPATIENT
Start: 2018-02-01 | End: 2018-07-05

## 2018-02-01 RX ORDER — MAGNESIUM OXIDE 400 MG (241.3 MG MAGNESIUM) TABLET
400 TABLET ONCE
Status: COMPLETED | OUTPATIENT
Start: 2018-02-01 | End: 2018-02-01

## 2018-02-01 RX ORDER — LORAZEPAM 0.5 MG/1
0.5 TABLET ORAL 2 TIMES DAILY PRN
Qty: 20 TABLET | Refills: 0 | Status: SHIPPED | OUTPATIENT
Start: 2018-02-01

## 2018-02-01 NOTE — PROGRESS NOTES
Pt to be discharged to rehab today as ordered. Report given to [facility]. Pt aware of outpt cath. HF DC instructions reviewed with pt. IV site dc, site CDI, belongings gathered to be sent with pt.

## 2018-02-01 NOTE — DISCHARGE SUMMARY
General Medicine Discharge Summary     Patient ID:  Aniket Ellis  [de-identified]year old  10/30/1935    Admit date: 1/30/2018    Discharge date and time: 2/1/18    Attending Physician: Froilan Ramsay MD     Consults: IP CONSULT TO HOSPITALIST  IP CONSULT TO CARDIOLOGY  I CVA with left sided hemiparesis who presents with generalized fatigue and increasing shortness of breath/leg swelling, found to have acute on chronic CHF, started IV bumex with improvement in symptoms, seen by cardiology and Dr. Jose Manuel Roberts whom recommended up parenchymal volume loss with sequela of chronic microangiopathy and large vessel atherosclerosis. 4. Lesser incidental findings as above. Xr Chest Ap Portable  (cpt=71045)    Result Date: 1/30/2018  CONCLUSION:  1.  No focal airspace disease or larg allopurinol 100 MG Tabs  Commonly known as:  ZYLOPRIM     apixaban 2.5 MG Tabs  Commonly known as:  ELIQUIS     carvedilol 3.125 MG Tabs  Commonly known as:  COREG  Take 1 tablet (3.125 mg total) by mouth 2 (two) times daily with meals.      Clopidogrel B Discharge Instructions:         Please check BMP in 1 week, results to cardiology. Please follow up with Dr. Pio Stoner as directed. Please follow up with PCP in 1-2 weeks  Please monitor daily weights and fluid restrict to 1.2 L.     Please call cardiology i MG Tabs  Commonly known as:  PLAVIX  Take 1 tablet (75 mg total) by mouth daily.      digoxin 0.125 MG Tabs  Commonly known as:  LANOXIN  TAKE 1 TABLET BY MOUTH EVERY OTHER DAY     ENTRESTO 24-26 MG Tabs  Generic drug:  Sacubitril-Valsartan  Take 1 tablet b

## 2018-02-01 NOTE — TRANSITION NOTE
[facility]    Cardiology Discharge Summary    [facility] Patient Status:  Observation    10/30/1935 MRN [id number]   Location [facility] 3W/SW Attending Toan Wing MD   Hosp Day # 1 PCP Marcio Laird MD       Subjective Tabs  Commonly known as:  [address]  Start taking on:  2/2/2018  What changed:  · medication strength  · how much to take  · how to take this  · when to take this  · additional instructions      Take 1 tablet (2 mg total) by mouth daily.    Quantity:  30 tablet tablet  Refills:  3     Fexofenadine HCl 180 MG Tabs  Commonly known as:  ALLEGRA      Take 180 mg by mouth daily.    Refills:  0     GLUCOCARD VITAL TEST Strp      Testing twice daily   Quantity:  200 strip  Refills:  1     NASONEX NA      1 spray by Nasal 09/30/2017   ALT 16 (L) 09/30/2017   TSH 2.49 01/08/2018   MG 1.8 02/01/2018   TROP 0.06 () 74/83/4173       JARET Arias  3/6/5834

## 2018-02-01 NOTE — PROGRESS NOTES
[facility]    Cardiology Progress Note    Alexeysindhu Ann Patient Status:  Observation    10/30/1935 MRN [id number]   Location [facility] 3W/SW Attending Jessica Contreras MD   Hosp Day # 1 PCP Claudean Palmer, MD     Primary [address] [address] [facility] stable      RECS  - multiple admission for various complaints including weakness, some component of inadequate decongestion  - residing at [facility] with patient noting inadequate salt restriction provided at facility.   - Increase bumex 2mg am, 1mg pm   - vascular congestion, similar in comparison to 1/8/2018. Ekg 12-lead    Result Date: 1/30/2018  ECG Report  Interpretation  -------------------------- Atrial flutter with intermittent ventricular pacing, PVC Diffuse low voltage.   - Extensive anterio

## 2018-02-01 NOTE — CM/SW NOTE
2/1/18 CM Discharge planning   Spoke with Jordan Palmer at [facility], bed available today at 2:00 pm, RN report [phone], transport arranged via [address] [place] [address]. Met with pt aware and in agreement with above rehab transfer.   Veronica Martinez X [id number]

## 2018-02-01 NOTE — PROGRESS NOTES
02/01/18 1446   Clinical Encounter Type   Visited With Patient   Routine Visit Introduction  (1st time seeing Pt this visit, but had seen Pt during previous visit)   Continue Visiting Yes   Patient's Supportive Strategies/Resources friend Elaina Evans and Rebeca Prather

## 2018-02-02 ENCOUNTER — TELEPHONE (OUTPATIENT)
Dept: CARDIOLOGY UNIT | Facility: HOSPITAL | Age: 83
End: 2018-02-02

## 2018-02-08 ENCOUNTER — IMAGING SERVICES (OUTPATIENT)
Dept: OTHER | Age: 83
End: 2018-02-08

## 2018-02-08 ENCOUNTER — HOSPITAL (OUTPATIENT)
Dept: OTHER | Age: 83
End: 2018-02-08
Attending: INTERNAL MEDICINE

## 2018-02-08 LAB
ALBUMIN SERPL-MCNC: 3.8 GM/DL (ref 3.6–5.1)
ALBUMIN/GLOB SERPL: 1.2 {RATIO} (ref 1–2.4)
ALP SERPL-CCNC: 79 UNIT/L (ref 45–117)
ALT SERPL-CCNC: 43 UNIT/L
ANALYZER ANC (IANC): ABNORMAL
ANION GAP SERPL CALC-SCNC: 12 MMOL/L (ref 10–20)
AST SERPL-CCNC: 31 UNIT/L
BASOPHILS # BLD: 0 THOUSAND/MCL (ref 0–0.3)
BASOPHILS NFR BLD: 0 %
BILIRUB SERPL-MCNC: 1.3 MG/DL (ref 0.2–1)
BUN SERPL-MCNC: 48 MG/DL (ref 6–20)
BUN/CREAT SERPL: 28 (ref 7–25)
CALCIUM SERPL-MCNC: 9.8 MG/DL (ref 8.4–10.2)
CHLORIDE: 103 MMOL/L (ref 98–107)
CO2 SERPL-SCNC: 32 MMOL/L (ref 21–32)
CREAT SERPL-MCNC: 1.72 MG/DL (ref 0.67–1.17)
DIFFERENTIAL METHOD BLD: ABNORMAL
EOSINOPHIL # BLD: 0.1 THOUSAND/MCL (ref 0.1–0.5)
EOSINOPHIL NFR BLD: 1 %
ERYTHROCYTE [DISTWIDTH] IN BLOOD: 16.2 % (ref 11–15)
GLOBULIN SER-MCNC: 3.2 GM/DL (ref 2–4)
GLUCOSE BLDC GLUCOMTR-MCNC: 130 MG/DL (ref 65–99)
GLUCOSE BLDC GLUCOMTR-MCNC: 138 MG/DL (ref 65–99)
GLUCOSE SERPL-MCNC: 177 MG/DL (ref 65–99)
HEMATOCRIT: 35.1 % (ref 39–51)
HGB BLD-MCNC: 11.3 GM/DL (ref 13–17)
LYMPHOCYTES # BLD: 0.7 THOUSAND/MCL (ref 1–4)
LYMPHOCYTES NFR BLD: 10 %
MCH RBC QN AUTO: 28 PG (ref 26–34)
MCHC RBC AUTO-ENTMCNC: 32.2 GM/DL (ref 32–36.5)
MCV RBC AUTO: 87.1 FL (ref 78–100)
MONOCYTES # BLD: 0.8 THOUSAND/MCL (ref 0.3–0.9)
MONOCYTES NFR BLD: 11 %
NEUTROPHILS # BLD: 5.8 THOUSAND/MCL (ref 1.8–7.7)
NEUTROPHILS NFR BLD: 78 %
NEUTS SEG NFR BLD: ABNORMAL %
PERCENT NRBC: ABNORMAL
PLATELET # BLD: 133 THOUSAND/MCL (ref 140–450)
POTASSIUM SERPL-SCNC: 4 MMOL/L (ref 3.4–5.1)
PROT SERPL-MCNC: 7 GM/DL (ref 6.4–8.2)
RBC # BLD: 4.03 MILLION/MCL (ref 4.5–5.9)
SODIUM SERPL-SCNC: 143 MMOL/L (ref 135–145)
TROPONIN I SERPL HS-MCNC: 0.11 NG/ML
WBC # BLD: 7.4 THOUSAND/MCL (ref 4.2–11)

## 2018-02-09 ENCOUNTER — PRIOR ORIGINAL RECORDS (OUTPATIENT)
Dept: OTHER | Age: 83
End: 2018-02-09

## 2018-02-09 ENCOUNTER — DIAGNOSTIC TRANS (OUTPATIENT)
Dept: OTHER | Age: 83
End: 2018-02-09

## 2018-02-09 LAB
GLUCOSE BLDC GLUCOMTR-MCNC: 125 MG/DL (ref 65–99)
MAGNESIUM SERPL-MCNC: 2.1 MG/DL (ref 1.7–2.4)
TROPONIN I SERPL HS-MCNC: 0.11 NG/ML

## 2018-02-10 LAB
ANION GAP SERPL CALC-SCNC: 12 MMOL/L (ref 10–20)
BUN SERPL-MCNC: 52 MG/DL (ref 6–20)
BUN/CREAT SERPL: 30 (ref 7–25)
CALCIUM SERPL-MCNC: 9.4 MG/DL (ref 8.4–10.2)
CHLORIDE: 102 MMOL/L (ref 98–107)
CO2 SERPL-SCNC: 31 MMOL/L (ref 21–32)
CREAT SERPL-MCNC: 1.72 MG/DL (ref 0.67–1.17)
GLUCOSE BLDC GLUCOMTR-MCNC: 129 MG/DL (ref 65–99)
GLUCOSE SERPL-MCNC: 128 MG/DL (ref 65–99)
POTASSIUM SERPL-SCNC: 3.9 MMOL/L (ref 3.4–5.1)
SODIUM SERPL-SCNC: 141 MMOL/L (ref 135–145)

## 2018-02-14 ENCOUNTER — PRIOR ORIGINAL RECORDS (OUTPATIENT)
Dept: OTHER | Age: 83
End: 2018-02-14

## 2018-02-15 ENCOUNTER — PRIOR ORIGINAL RECORDS (OUTPATIENT)
Dept: OTHER | Age: 83
End: 2018-02-15

## 2018-02-15 ENCOUNTER — OFFICE VISIT (OUTPATIENT)
Dept: CARDIOLOGY CLINIC | Facility: HOSPITAL | Age: 83
End: 2018-02-15
Attending: INTERNAL MEDICINE
Payer: MEDICARE

## 2018-02-15 VITALS
HEART RATE: 86 BPM | SYSTOLIC BLOOD PRESSURE: 90 MMHG | DIASTOLIC BLOOD PRESSURE: 58 MMHG | WEIGHT: 139 LBS | BODY MASS INDEX: 22 KG/M2 | OXYGEN SATURATION: 99 %

## 2018-02-15 DIAGNOSIS — I50.22 CHRONIC SYSTOLIC HEART FAILURE (HCC): ICD-10-CM

## 2018-02-15 DIAGNOSIS — I50.9 HEART FAILURE, UNSPECIFIED (HCC): Primary | ICD-10-CM

## 2018-02-15 LAB
ANION GAP SERPL CALC-SCNC: 11 MMOL/L (ref 0–18)
BUN SERPL-MCNC: 70 MG/DL (ref 8–20)
BUN/CREAT SERPL: 28.1 (ref 10–20)
CALCIUM SERPL-MCNC: 9.3 MG/DL (ref 8.5–10.5)
CHLORIDE SERPL-SCNC: 98 MMOL/L (ref 95–110)
CO2 SERPL-SCNC: 28 MMOL/L (ref 22–32)
CREAT SERPL-MCNC: 2.49 MG/DL (ref 0.5–1.5)
GLUCOSE SERPL-MCNC: 171 MG/DL (ref 70–99)
MAGNESIUM SERPL-MCNC: 2.4 MG/DL (ref 1.8–2.5)
OSMOLALITY UR CALC.SUM OF ELEC: 309 MOSM/KG (ref 275–295)
POTASSIUM SERPL-SCNC: 4.2 MMOL/L (ref 3.3–5.1)
SODIUM SERPL-SCNC: 137 MMOL/L (ref 136–144)

## 2018-02-15 PROCEDURE — 80048 BASIC METABOLIC PNL TOTAL CA: CPT | Performed by: CLINICAL NURSE SPECIALIST

## 2018-02-15 PROCEDURE — 36415 COLL VENOUS BLD VENIPUNCTURE: CPT | Performed by: CLINICAL NURSE SPECIALIST

## 2018-02-15 PROCEDURE — 99211 OFF/OP EST MAY X REQ PHY/QHP: CPT | Performed by: CLINICAL NURSE SPECIALIST

## 2018-02-15 PROCEDURE — 99214 OFFICE O/P EST MOD 30 MIN: CPT | Performed by: CLINICAL NURSE SPECIALIST

## 2018-02-15 PROCEDURE — 83735 ASSAY OF MAGNESIUM: CPT | Performed by: CLINICAL NURSE SPECIALIST

## 2018-02-15 NOTE — PROGRESS NOTES
[address] [place] [address] Patient Status:  Outpatient    10/30/1935 MRN [id number]   Location MD Dr Loretta Whitehead Dr is a [de-identified]year old male who presents to i 02/01/2018 06:08 AM    02/01/2018 06:08 AM   K 3.6 02/01/2018 06:08 AM   K 3.6 02/01/2018 06:08 AM    02/01/2018 06:08 AM   CO2 28 02/01/2018 06:08 AM    (H) 02/01/2018 06:08 AM   CA 8.9 02/01/2018 06:08 AM   ALB 4.5 09/30/2017 08:11 AM slept most of the day, today he reports being able to shower independently and feels well. Denies orthopnea. Lungs clear. No JVD. No bilateral lower exremity edema. Denies abdominal swelling/bloating. Denies chest pain and palpitations.      Is now taking b

## 2018-02-15 NOTE — PATIENT INSTRUCTIONS
Continue current medications    Continue tracking daily weights. Call with weight gain of 3 lbs overnight or concerning symptoms. [phone]    32-64 oz fluid restriction    Less than 2000 mg sodium/salt diet.  Common high sodium foods include frozen di

## 2018-02-21 ENCOUNTER — OFFICE VISIT (OUTPATIENT)
Dept: CARDIOLOGY CLINIC | Facility: HOSPITAL | Age: 83
End: 2018-02-21
Attending: INTERNAL MEDICINE
Payer: MEDICARE

## 2018-02-21 VITALS
OXYGEN SATURATION: 100 % | SYSTOLIC BLOOD PRESSURE: 92 MMHG | DIASTOLIC BLOOD PRESSURE: 56 MMHG | WEIGHT: 142.19 LBS | BODY MASS INDEX: 22 KG/M2 | HEART RATE: 84 BPM

## 2018-02-21 DIAGNOSIS — I50.9 HEART FAILURE, UNSPECIFIED (HCC): Primary | ICD-10-CM

## 2018-02-21 DIAGNOSIS — I50.22 CHRONIC SYSTOLIC HEART FAILURE (HCC): ICD-10-CM

## 2018-02-21 LAB
ANION GAP SERPL CALC-SCNC: 11 MMOL/L (ref 0–18)
BUN SERPL-MCNC: 75 MG/DL (ref 8–20)
BUN/CREAT SERPL: 27.6 (ref 10–20)
CALCIUM SERPL-MCNC: 9.3 MG/DL (ref 8.5–10.5)
CHLORIDE SERPL-SCNC: 99 MMOL/L (ref 95–110)
CO2 SERPL-SCNC: 29 MMOL/L (ref 22–32)
CREAT SERPL-MCNC: 2.72 MG/DL (ref 0.5–1.5)
GLUCOSE SERPL-MCNC: 190 MG/DL (ref 70–99)
OSMOLALITY UR CALC.SUM OF ELEC: 315 MOSM/KG (ref 275–295)
POTASSIUM SERPL-SCNC: 5.1 MMOL/L (ref 3.3–5.1)
SODIUM SERPL-SCNC: 139 MMOL/L (ref 136–144)

## 2018-02-21 PROCEDURE — 99214 OFFICE O/P EST MOD 30 MIN: CPT | Performed by: CLINICAL NURSE SPECIALIST

## 2018-02-21 PROCEDURE — 99211 OFF/OP EST MAY X REQ PHY/QHP: CPT | Performed by: CLINICAL NURSE SPECIALIST

## 2018-02-21 PROCEDURE — 36415 COLL VENOUS BLD VENIPUNCTURE: CPT | Performed by: CLINICAL NURSE SPECIALIST

## 2018-02-21 PROCEDURE — 80048 BASIC METABOLIC PNL TOTAL CA: CPT | Performed by: CLINICAL NURSE SPECIALIST

## 2018-02-21 NOTE — PATIENT INSTRUCTIONS
For the next two days (Wednesday and Thursday) please increase bumex to 2 mg in the am and 2 mg in the afternoon and then resume 2 mg in the morning and 1 mg in the afternoon    Continue tracking daily weights.  Call with weight gain of 3 lbs overnight or c

## 2018-02-21 NOTE — PROGRESS NOTES
[address] [place] [address] Patient Status:  Outpatient    10/30/1935 MRN [id number]   Location MD Dr Tad Wiley Dr is a [de-identified]year old male who presents to i AM   K 5.1 02/21/2018 08:23 AM   CL 99 02/21/2018 08:23 AM   CO2 29 02/21/2018 08:23 AM    (H) 02/21/2018 08:23 AM   CA 9.3 02/21/2018 08:23 AM   ALB 4.5 09/30/2017 08:11 AM   ALKPHO 69 09/30/2017 08:11 AM   BILT 1.0 09/30/2017 08:11 AM   TP 7.4 09/ Decision Making:   Here today post hospitalization for acute HF exacerbation. He reports that today he is feeling a little better, but continues to have \"good days and bad days. \" Denies orthopnea. Lungs clear. No JVD.  No bilateral lower exremity ed

## 2018-03-02 ENCOUNTER — PRIOR ORIGINAL RECORDS (OUTPATIENT)
Dept: OTHER | Age: 83
End: 2018-03-02

## 2018-03-02 ENCOUNTER — OFFICE VISIT (OUTPATIENT)
Dept: CARDIOLOGY CLINIC | Facility: HOSPITAL | Age: 83
End: 2018-03-02
Attending: INTERNAL MEDICINE
Payer: MEDICARE

## 2018-03-02 VITALS
OXYGEN SATURATION: 100 % | DIASTOLIC BLOOD PRESSURE: 52 MMHG | BODY MASS INDEX: 22 KG/M2 | HEART RATE: 87 BPM | WEIGHT: 139 LBS | SYSTOLIC BLOOD PRESSURE: 82 MMHG

## 2018-03-02 DIAGNOSIS — I50.9 HEART FAILURE, UNSPECIFIED (HCC): Primary | ICD-10-CM

## 2018-03-02 DIAGNOSIS — I50.22 CHRONIC SYSTOLIC HEART FAILURE (HCC): ICD-10-CM

## 2018-03-02 LAB
ANION GAP SERPL CALC-SCNC: 10 MMOL/L (ref 0–18)
BUN SERPL-MCNC: 61 MG/DL (ref 8–20)
BUN/CREAT SERPL: 22.8 (ref 10–20)
CALCIUM SERPL-MCNC: 9 MG/DL (ref 8.5–10.5)
CHLORIDE SERPL-SCNC: 97 MMOL/L (ref 95–110)
CO2 SERPL-SCNC: 28 MMOL/L (ref 22–32)
CREAT SERPL-MCNC: 2.68 MG/DL (ref 0.5–1.5)
GLUCOSE SERPL-MCNC: 164 MG/DL (ref 70–99)
OSMOLALITY UR CALC.SUM OF ELEC: 301 MOSM/KG (ref 275–295)
POTASSIUM SERPL-SCNC: 4.1 MMOL/L (ref 3.3–5.1)
SODIUM SERPL-SCNC: 135 MMOL/L (ref 136–144)

## 2018-03-02 PROCEDURE — 99211 OFF/OP EST MAY X REQ PHY/QHP: CPT | Performed by: CLINICAL NURSE SPECIALIST

## 2018-03-02 PROCEDURE — 80048 BASIC METABOLIC PNL TOTAL CA: CPT | Performed by: CLINICAL NURSE SPECIALIST

## 2018-03-02 PROCEDURE — 36415 COLL VENOUS BLD VENIPUNCTURE: CPT | Performed by: CLINICAL NURSE SPECIALIST

## 2018-03-02 PROCEDURE — 99214 OFFICE O/P EST MOD 30 MIN: CPT | Performed by: CLINICAL NURSE SPECIALIST

## 2018-03-02 NOTE — PROGRESS NOTES
[address] [place] [address] Patient Status:  Outpatient    10/30/1935 MRN [id number]   Location MD Dr Ray Montiel Dr is a [de-identified]year old male who presents to i 5.1 02/21/2018 08:23 AM   CL 99 02/21/2018 08:23 AM   CO2 29 02/21/2018 08:23 AM    (H) 02/21/2018 08:23 AM   CA 9.3 02/21/2018 08:23 AM   ALB 4.5 09/30/2017 08:11 AM   ALKPHO 69 09/30/2017 08:11 AM   BILT 1.0 09/30/2017 08:11 AM   TP 7.4 09/30/2017 Today, Mr Gemini Luevano walked in to the clinic without his walker. He has been using his cane only and trying to walk outside as much as able. He feels stronger every day. Denies orthopnea. Lungs clear. No JVD. No bilateral lower exremity edema.  No abdominal

## 2018-03-12 ENCOUNTER — PRIOR ORIGINAL RECORDS (OUTPATIENT)
Dept: OTHER | Age: 83
End: 2018-03-12

## 2018-03-15 LAB
BUN: 61 MG/DL
CALCIUM: 9 MG/DL
CHLORIDE: 97 MEQ/L
CREATININE, SERUM: 2.68 MG/DL
GLUCOSE: 164 MG/DL
HEMATOCRIT: 34.9 %
HEMOGLOBIN: 11.4 G/DL
MAGNESIUM: 2.4 MG/DL
PLATELETS: 113 K/UL
POTASSIUM, SERUM: 4.1 MEQ/L
RED BLOOD COUNT: 3.97 X 10-6/U
SODIUM: 135 MEQ/L
WHITE BLOOD COUNT: 6.3 X 10-3/U

## 2018-03-27 ENCOUNTER — OFFICE VISIT (OUTPATIENT)
Dept: CARDIOLOGY CLINIC | Facility: HOSPITAL | Age: 83
End: 2018-03-27
Attending: INTERNAL MEDICINE
Payer: MEDICARE

## 2018-03-27 VITALS
WEIGHT: 142.81 LBS | OXYGEN SATURATION: 100 % | SYSTOLIC BLOOD PRESSURE: 83 MMHG | BODY MASS INDEX: 22 KG/M2 | DIASTOLIC BLOOD PRESSURE: 47 MMHG | HEART RATE: 83 BPM

## 2018-03-27 DIAGNOSIS — I50.22 CHRONIC SYSTOLIC HEART FAILURE (HCC): Primary | ICD-10-CM

## 2018-03-27 DIAGNOSIS — N17.9 ACUTE KIDNEY INJURY (HCC): ICD-10-CM

## 2018-03-27 DIAGNOSIS — I50.9 HEART FAILURE, UNSPECIFIED (HCC): ICD-10-CM

## 2018-03-27 LAB
ANION GAP SERPL CALC-SCNC: 12 MMOL/L (ref 0–18)
BNP SERPL-MCNC: 920 PG/ML (ref 0–100)
BUN SERPL-MCNC: 106 MG/DL (ref 8–20)
BUN/CREAT SERPL: 23.5 (ref 10–20)
CALCIUM SERPL-MCNC: 9.1 MG/DL (ref 8.5–10.5)
CHLORIDE SERPL-SCNC: 93 MMOL/L (ref 95–110)
CO2 SERPL-SCNC: 28 MMOL/L (ref 22–32)
CREAT SERPL-MCNC: 4.51 MG/DL (ref 0.5–1.5)
GLUCOSE SERPL-MCNC: 222 MG/DL (ref 70–99)
OSMOLALITY UR CALC.SUM OF ELEC: 316 MOSM/KG (ref 275–295)
POTASSIUM SERPL-SCNC: 5 MMOL/L (ref 3.3–5.1)
SODIUM SERPL-SCNC: 133 MMOL/L (ref 136–144)

## 2018-03-27 PROCEDURE — 36415 COLL VENOUS BLD VENIPUNCTURE: CPT | Performed by: NURSE PRACTITIONER

## 2018-03-27 PROCEDURE — 99214 OFFICE O/P EST MOD 30 MIN: CPT | Performed by: NURSE PRACTITIONER

## 2018-03-27 PROCEDURE — 83880 ASSAY OF NATRIURETIC PEPTIDE: CPT | Performed by: NURSE PRACTITIONER

## 2018-03-27 PROCEDURE — 80048 BASIC METABOLIC PNL TOTAL CA: CPT | Performed by: NURSE PRACTITIONER

## 2018-03-27 PROCEDURE — 99211 OFF/OP EST MAY X REQ PHY/QHP: CPT | Performed by: NURSE PRACTITIONER

## 2018-03-27 NOTE — PROGRESS NOTES
[address] [place] [address] Patient Status:  Outpatient    10/30/1935 MRN [id number]   Location MD Dr Loretta Whitehead Dr is a [de-identified]year old male who presents to i K 4.1 03/02/2018 09:42 AM   CL 97 03/02/2018 09:42 AM   CO2 28 03/02/2018 09:42 AM    (H) 03/02/2018 09:42 AM   CA 9.0 03/02/2018 09:42 AM   ALB 4.5 09/30/2017 08:11 AM   ALKPHO 69 09/30/2017 08:11 AM   BILT 1.0 09/30/2017 08:11 AM   TP 7.4 09/30/ exacerbation. Today, Mr Rayna Vargas walked in to the clinic with his walker. He was seen by Dr. Angelo Bryan on 3/12/18. He is currently awaiting approval from insurance for [facility] placement.      He admits to eating  higher than normal sodium meals over the for sodium content.      Appointment with Dr Ray Martinez 5/7/18    Return to clinic 4/4/18      I spent greater than 50 minutes with this patient providing counseling, coordination of care and education related specifically to heart failure    ROBERT ELENA, 03/27/

## 2018-03-27 NOTE — PATIENT INSTRUCTIONS
Discontinue lisinopril     Hold bumex tonight    Decrease bumex to 1mg  Daily    Repeat blood work on Friday    Continue tracking daily weights. Call with weight gain of 3 lbs overnight or concerning symptoms.    [phone]    32-64 oz fluid restriction

## 2018-03-29 ENCOUNTER — PRIOR ORIGINAL RECORDS (OUTPATIENT)
Dept: OTHER | Age: 83
End: 2018-03-29

## 2018-03-29 LAB
BUN: 81 MG/DL
CALCIUM: 10.1 MG/DL
CHLORIDE: 99 MEQ/L
CREATININE, SERUM: 2.8 MG/DL
POTASSIUM, SERUM: 4.8 MEQ/L
SODIUM: 142 MEQ/L

## 2018-03-30 ENCOUNTER — PRIOR ORIGINAL RECORDS (OUTPATIENT)
Dept: OTHER | Age: 83
End: 2018-03-30

## 2018-03-30 ENCOUNTER — TELEPHONE (OUTPATIENT)
Dept: CARDIOLOGY CLINIC | Facility: HOSPITAL | Age: 83
End: 2018-03-30

## 2018-03-30 NOTE — TELEPHONE ENCOUNTER
Spoke with patient regarding his blood results. Renal function almost returned to baseline BUN/Cr 81/2.8 (was 106/4.51). Patient continued on his Bumex 2 mg daily dose. Home wt back to 134. Feeling well. Instructed to continue bumex 2 mg daily.  Return

## 2018-04-03 ENCOUNTER — TELEPHONE (OUTPATIENT)
Dept: CARDIOLOGY CLINIC | Facility: HOSPITAL | Age: 83
End: 2018-04-03

## 2018-04-03 NOTE — TELEPHONE ENCOUNTER
Patient called stating \"I think I'm having a TIA\".   After review of symptoms and patient denying visual changes, dizzyness, HA, syncope, decreased motor function on either side, slurred speech, was able to ascertain patient is very anxious about his heal

## 2018-04-04 ENCOUNTER — TELEPHONE (OUTPATIENT)
Dept: CARDIOLOGY CLINIC | Facility: HOSPITAL | Age: 83
End: 2018-04-04

## 2018-04-04 NOTE — TELEPHONE ENCOUNTER
Was seen by Dr. Yon Waddell yesterday. Patient states PCP did not notice rales,  LE resolved. Continues with SOB. Current wt 133 lb. He spoke with Dr. Yon Waddell yesterday regarding palliative care, no official order placed.   Patient states he would like to continu

## 2018-04-10 ENCOUNTER — PRIOR ORIGINAL RECORDS (OUTPATIENT)
Dept: OTHER | Age: 83
End: 2018-04-10

## 2018-04-16 ENCOUNTER — OFFICE VISIT (OUTPATIENT)
Dept: CARDIOLOGY CLINIC | Facility: HOSPITAL | Age: 83
End: 2018-04-16
Attending: INTERNAL MEDICINE
Payer: MEDICARE

## 2018-04-16 ENCOUNTER — PRIOR ORIGINAL RECORDS (OUTPATIENT)
Dept: OTHER | Age: 83
End: 2018-04-16

## 2018-04-16 VITALS
BODY MASS INDEX: 22 KG/M2 | OXYGEN SATURATION: 98 % | SYSTOLIC BLOOD PRESSURE: 97 MMHG | WEIGHT: 139.63 LBS | HEART RATE: 72 BPM | DIASTOLIC BLOOD PRESSURE: 62 MMHG

## 2018-04-16 DIAGNOSIS — R53.83 FATIGUE, UNSPECIFIED TYPE: ICD-10-CM

## 2018-04-16 DIAGNOSIS — I50.9 HEART FAILURE, UNSPECIFIED (HCC): ICD-10-CM

## 2018-04-16 DIAGNOSIS — I50.22 CHRONIC SYSTOLIC HEART FAILURE (HCC): Primary | ICD-10-CM

## 2018-04-16 PROCEDURE — 99211 OFF/OP EST MAY X REQ PHY/QHP: CPT | Performed by: NURSE PRACTITIONER

## 2018-04-16 PROCEDURE — 80048 BASIC METABOLIC PNL TOTAL CA: CPT | Performed by: NURSE PRACTITIONER

## 2018-04-16 PROCEDURE — 36415 COLL VENOUS BLD VENIPUNCTURE: CPT | Performed by: NURSE PRACTITIONER

## 2018-04-16 PROCEDURE — 99214 OFFICE O/P EST MOD 30 MIN: CPT | Performed by: NURSE PRACTITIONER

## 2018-04-16 NOTE — PATIENT INSTRUCTIONS
Continue current medications    Continue tracking daily weights. Call with weight gain of 3 lbs overnight or concerning symptoms. [phone]    32-64 oz fluid restriction    Less than 2000 mg sodium/salt diet.  Common high sodium foods include frozen di

## 2018-04-16 NOTE — PROGRESS NOTES
[address] [place] [address] Patient Status:  Outpatient    10/30/1935 MRN [id number]   Location MD Dr Mazin Gonzalez Dr is a [de-identified]year old male who presents to i to read a book yesterday and concentrate on the contents which made him happy.   MEDINA Can walk about 200 ft    Review of Systems:  Constitutional: negative  Respiratory: negative  Cardiovascular: negative  Gastrointestinal: negative  Hematologic/lymphatic: n 61/15  PA: 68/28 m: 35  PCWP: 26    Dave CO/CI: 3.1 / 1.8  Dave PRVR/PVRI: 2.9 / 5.0  Dave SVR: 2047     Thermodilution CO/CI: 3.1 / 1.8  Thermodilution PVR/PVRI: 2.9 / 5.1      Education:  Reviewed disease process, sodium/fluid restriction, and medication Common high sodium foods include frozen dinners, soups (not homemade), some cereal, vegetable juice, canned vegetables, lunch meats, processed meats like hotdogs, sausage, coyne, pepperoni, soy sauce, pre-packaged rice or potatoes.  Please remember to read

## 2018-05-07 ENCOUNTER — PRIOR ORIGINAL RECORDS (OUTPATIENT)
Dept: OTHER | Age: 83
End: 2018-05-07

## 2018-05-08 LAB
BUN: 38 MG/DL
CALCIUM: 9.3 MG/DL
CHLORIDE: 101 MEQ/L
CREATININE, SERUM: 2.6 MG/DL
GLUCOSE: 192 MG/DL
POTASSIUM, SERUM: 5 MEQ/L
SODIUM: 136 MEQ/L

## 2018-05-21 ENCOUNTER — OFFICE VISIT (OUTPATIENT)
Dept: CARDIOLOGY CLINIC | Facility: HOSPITAL | Age: 83
End: 2018-05-21
Attending: INTERNAL MEDICINE
Payer: MEDICARE

## 2018-05-21 VITALS
BODY MASS INDEX: 22 KG/M2 | OXYGEN SATURATION: 100 % | WEIGHT: 141.5 LBS | DIASTOLIC BLOOD PRESSURE: 61 MMHG | SYSTOLIC BLOOD PRESSURE: 98 MMHG | HEART RATE: 81 BPM

## 2018-05-21 DIAGNOSIS — I50.9 HEART FAILURE, UNSPECIFIED (HCC): ICD-10-CM

## 2018-05-21 DIAGNOSIS — E11.9 TYPE 2 DIABETES MELLITUS WITHOUT COMPLICATION, WITHOUT LONG-TERM CURRENT USE OF INSULIN (HCC): ICD-10-CM

## 2018-05-21 DIAGNOSIS — I50.22 CHRONIC SYSTOLIC HEART FAILURE (HCC): Primary | ICD-10-CM

## 2018-05-21 PROCEDURE — 36415 COLL VENOUS BLD VENIPUNCTURE: CPT | Performed by: NURSE PRACTITIONER

## 2018-05-21 PROCEDURE — 80048 BASIC METABOLIC PNL TOTAL CA: CPT | Performed by: NURSE PRACTITIONER

## 2018-05-21 PROCEDURE — 99211 OFF/OP EST MAY X REQ PHY/QHP: CPT | Performed by: NURSE PRACTITIONER

## 2018-05-21 PROCEDURE — 99214 OFFICE O/P EST MOD 30 MIN: CPT | Performed by: NURSE PRACTITIONER

## 2018-05-21 PROCEDURE — 83880 ASSAY OF NATRIURETIC PEPTIDE: CPT | Performed by: NURSE PRACTITIONER

## 2018-05-21 RX ORDER — MULTIVIT WITH MINERALS/LUTEIN
1000 TABLET ORAL DAILY
COMMUNITY

## 2018-05-21 NOTE — PROGRESS NOTES
[address] [place] [address] Patient Status:  Outpatient    10/30/1935 MRN [id number]   Location [employer], MD Dr Charol Lick Dr Sunday Simas is a [de-identified]year old male who presents to i negative  Respiratory: negative for cough and wheezing  Cardiovascular: negative for chest pain, fatigue and paroxysmal nocturnal dyspnea  Gastrointestinal: negative for constipation and diarrhea  Hematologic/lymphatic: negative  Musculoskeletal:negative 26    Dave CO/CI: 3.1 / 1.8  Dave PRVR/PVRI: 2.9 / 5.0  Dave SVR: 2047     Thermodilution CO/CI: 3.1 / 1.8  Thermodilution PVR/PVRI: 2.9 / 5.1      Education:  Reviewed disease process, sodium/fluid restriction, and medications with patient. Receptive. dinners, soups (not homemade), some cereal, vegetable juice, canned vegetables, lunch meats, processed meats like hotdogs, sausage, coyne, pepperoni, soy sauce, pre-packaged rice or potatoes. Please remember to read nutrition labels for sodium content.

## 2018-05-21 NOTE — PATIENT INSTRUCTIONS
Increase Bumex to 2 mg in am and 1 mg in pm for 2 days, then Bumex 2 mg daily    Continue tracking daily weights. Call with weight gain of 3 lbs overnight or concerning symptoms.    [phone]    32-64 oz fluid restriction    Less than 2000 mg sodium/salt

## 2018-05-29 ENCOUNTER — TELEPHONE (OUTPATIENT)
Dept: CARDIOLOGY CLINIC | Facility: HOSPITAL | Age: 83
End: 2018-05-29

## 2018-05-29 NOTE — TELEPHONE ENCOUNTER
Patient called to say that over the last week he has gained 3 pounds. He states his legs are a bit more swollen. Patient was offered an appointment today or tomorrow he states he is unable to do that.   He does have an appointment coming up in about a wee

## 2018-06-06 ENCOUNTER — OFFICE VISIT (OUTPATIENT)
Dept: CARDIOLOGY CLINIC | Facility: HOSPITAL | Age: 83
End: 2018-06-06
Attending: INTERNAL MEDICINE
Payer: MEDICARE

## 2018-06-06 VITALS
BODY MASS INDEX: 22 KG/M2 | HEART RATE: 81 BPM | SYSTOLIC BLOOD PRESSURE: 91 MMHG | WEIGHT: 141.88 LBS | OXYGEN SATURATION: 97 % | DIASTOLIC BLOOD PRESSURE: 57 MMHG

## 2018-06-06 DIAGNOSIS — I50.22 CHRONIC SYSTOLIC HEART FAILURE (HCC): Primary | ICD-10-CM

## 2018-06-06 DIAGNOSIS — I50.9 HEART FAILURE, UNSPECIFIED (HCC): ICD-10-CM

## 2018-06-06 DIAGNOSIS — E11.9 TYPE 2 DIABETES MELLITUS WITHOUT COMPLICATION, WITHOUT LONG-TERM CURRENT USE OF INSULIN (HCC): ICD-10-CM

## 2018-06-06 PROCEDURE — 99213 OFFICE O/P EST LOW 20 MIN: CPT | Performed by: NURSE PRACTITIONER

## 2018-06-06 PROCEDURE — 36415 COLL VENOUS BLD VENIPUNCTURE: CPT | Performed by: NURSE PRACTITIONER

## 2018-06-06 PROCEDURE — 80048 BASIC METABOLIC PNL TOTAL CA: CPT | Performed by: NURSE PRACTITIONER

## 2018-06-06 PROCEDURE — 99211 OFF/OP EST MAY X REQ PHY/QHP: CPT | Performed by: NURSE PRACTITIONER

## 2018-06-06 NOTE — PROGRESS NOTES
[address] [facility] Patient Status:  Outpatient    10/30/1935 MRN [id number]   Location MD Dr Martha Sanchez Dr is a [de-identified]year old male who presents to i about stressful situations too much. He will take an occasional ativan which alleviates his symptoms.     Review of Systems:  Constitutional: negative  Respiratory: positive for mild cough with phlegm-associates this with his seasonal allergies,, negative f and symmetric  Neurologic: Grossly normal    Cardiographics:  ECG: V-paced 71 bmp     Echocardiogram: EF 10% per chart review     Right Heart Cath: 2/8/18  BP: 108/65  RA: v: 21, m 16  RV: 61/15  PA: 68/28 m: 35  PCWP: 26    Dave CO/CI: 3.1 / 1.8  Dave PRV weight gain of 3 lbs overnight or concerning symptoms. [phone]    32-64 oz fluid restriction    Less than 2000 mg sodium/salt diet.  Common high sodium foods include frozen dinners, soups (not homemade), some cereal, vegetable juice, canned vegetable

## 2018-06-18 ENCOUNTER — HOSPITAL ENCOUNTER (EMERGENCY)
Facility: HOSPITAL | Age: 83
Discharge: HOME | End: 2018-06-18
Attending: EMERGENCY MEDICINE
Payer: MEDICARE

## 2018-06-18 VITALS
HEART RATE: 72 BPM | BODY MASS INDEX: 22.49 KG/M2 | OXYGEN SATURATION: 99 % | HEIGHT: 67 IN | DIASTOLIC BLOOD PRESSURE: 70 MMHG | SYSTOLIC BLOOD PRESSURE: 101 MMHG | RESPIRATION RATE: 16 BRPM | TEMPERATURE: 99 F | WEIGHT: 143.31 LBS

## 2018-06-18 DIAGNOSIS — M54.50 ACUTE BILATERAL LOW BACK PAIN WITHOUT SCIATICA: Primary | ICD-10-CM

## 2018-06-18 PROCEDURE — 99285 EMERGENCY DEPT VISIT HI MDM: CPT

## 2018-06-18 PROCEDURE — 96372 THER/PROPH/DIAG INJ SC/IM: CPT

## 2018-06-18 RX ORDER — MORPHINE SULFATE 4 MG/ML
4 INJECTION, SOLUTION INTRAMUSCULAR; INTRAVENOUS ONCE
Status: DISCONTINUED | OUTPATIENT
Start: 2018-06-18 | End: 2018-06-18

## 2018-06-18 RX ORDER — MORPHINE SULFATE 4 MG/ML
6 INJECTION, SOLUTION INTRAMUSCULAR; INTRAVENOUS ONCE
Status: COMPLETED | OUTPATIENT
Start: 2018-06-18 | End: 2018-06-18

## 2018-06-18 RX ORDER — HYDROCODONE BITARTRATE AND ACETAMINOPHEN 5; 325 MG/1; MG/1
1-2 TABLET ORAL EVERY 4 HOURS PRN
Qty: 10 TABLET | Refills: 0 | Status: SHIPPED | OUTPATIENT
Start: 2018-06-18 | End: 2018-06-25

## 2018-06-18 RX ORDER — CYCLOBENZAPRINE HCL 10 MG
10 TABLET ORAL 3 TIMES DAILY PRN
Qty: 20 TABLET | Refills: 0 | Status: SHIPPED | OUTPATIENT
Start: 2018-06-18 | End: 2018-06-25

## 2018-06-18 RX ORDER — HYDROCODONE BITARTRATE AND ACETAMINOPHEN 5; 325 MG/1; MG/1
2 TABLET ORAL ONCE
Status: COMPLETED | OUTPATIENT
Start: 2018-06-18 | End: 2018-06-18

## 2018-06-18 RX ORDER — CYCLOBENZAPRINE HCL 10 MG
10 TABLET ORAL 3 TIMES DAILY PRN
Status: DISCONTINUED | OUTPATIENT
Start: 2018-06-18 | End: 2018-06-18

## 2018-06-18 NOTE — ED INITIAL ASSESSMENT (HPI)
Low back pain, getting worse over last 2 weeks, pt was sleeping on a bad mattress, now back at [place]    No relief from tramadol, tylenol or ativan

## 2018-06-18 NOTE — ED NOTES
Assumed care of pt from EMS. Pt states he spent a couple days at a friends house and was sleeping on an old mattress. Pt states he then developed low back pain. Pt states he has been taking tylenol, tramadol and ativan without relief.   Pt ambulated esme

## 2018-06-19 NOTE — ED PROVIDER NOTES
Patient Seen in: [facility] AND [facility] Emergency Department    History   Patient presents with:  Back Pain    Stated Complaint:     HPI    Patient is an 27-year-old male who presents to the emergency department with a chief complaint of low back pain.   Carlito time. He appears well-developed and well-nourished. HENT:   Head: Normocephalic and atraumatic. Eyes: Conjunctivae and EOM are normal. Pupils are equal, round, and reactive to light. Neck: Neck supple.    Cardiovascular: Normal rate, regular rhythm an daily as needed for Muscle spasms.   Qty: 20 tablet Refills: 0

## 2018-07-05 ENCOUNTER — OFFICE VISIT (OUTPATIENT)
Dept: CARDIOLOGY CLINIC | Facility: HOSPITAL | Age: 83
End: 2018-07-05
Attending: CLINICAL NURSE SPECIALIST
Payer: MEDICARE

## 2018-07-05 VITALS
BODY MASS INDEX: 23 KG/M2 | DIASTOLIC BLOOD PRESSURE: 55 MMHG | SYSTOLIC BLOOD PRESSURE: 90 MMHG | WEIGHT: 142.19 LBS | HEART RATE: 73 BPM | OXYGEN SATURATION: 99 %

## 2018-07-05 DIAGNOSIS — I50.9 HEART FAILURE, UNSPECIFIED (HCC): Primary | ICD-10-CM

## 2018-07-05 DIAGNOSIS — I50.22 CHRONIC SYSTOLIC HEART FAILURE (HCC): ICD-10-CM

## 2018-07-05 LAB
ANION GAP SERPL CALC-SCNC: 11 MMOL/L (ref 0–18)
BUN SERPL-MCNC: 42 MG/DL (ref 8–20)
BUN/CREAT SERPL: 17.4 (ref 10–20)
CALCIUM SERPL-MCNC: 9.1 MG/DL (ref 8.5–10.5)
CHLORIDE SERPL-SCNC: 100 MMOL/L (ref 95–110)
CO2 SERPL-SCNC: 26 MMOL/L (ref 22–32)
CREAT SERPL-MCNC: 2.41 MG/DL (ref 0.5–1.5)
GLUCOSE SERPL-MCNC: 189 MG/DL (ref 70–99)
OSMOLALITY UR CALC.SUM OF ELEC: 300 MOSM/KG (ref 275–295)
POTASSIUM SERPL-SCNC: 4.8 MMOL/L (ref 3.3–5.1)
SODIUM SERPL-SCNC: 137 MMOL/L (ref 136–144)

## 2018-07-05 PROCEDURE — 99211 OFF/OP EST MAY X REQ PHY/QHP: CPT | Performed by: CLINICAL NURSE SPECIALIST

## 2018-07-05 PROCEDURE — 36415 COLL VENOUS BLD VENIPUNCTURE: CPT | Performed by: CLINICAL NURSE SPECIALIST

## 2018-07-05 PROCEDURE — 80048 BASIC METABOLIC PNL TOTAL CA: CPT | Performed by: CLINICAL NURSE SPECIALIST

## 2018-07-05 PROCEDURE — 99214 OFFICE O/P EST MOD 30 MIN: CPT | Performed by: CLINICAL NURSE SPECIALIST

## 2018-07-05 RX ORDER — BUMETANIDE 1 MG/1
2 TABLET ORAL DAILY
Qty: 30 TABLET | Refills: 0 | Status: ON HOLD | COMMUNITY
Start: 2018-07-05 | End: 2018-07-25

## 2018-07-05 NOTE — PROGRESS NOTES
[address] [place] [address] Patient Status:  Outpatient    10/30/1935 MRN [id number]   Location MD Dr Loretta Whitehead Dr is a [de-identified]year old male who presents to i much. He will take an occasional ativan which alleviates his symptoms. Has had back pain recently that prompted him to be evaluated in the ED.  Now seeing ortho, will have an MRI     Review of Systems:  Constitutional: negative  Respiratory: positive for mi organomegaly  Extremities: edema trace lower extremity edema bilaterally  Pulses: 2+ and symmetric  Neurologic: Grossly normal    Cardiographics:  ECG: V-paced 71 bmp     Echocardiogram: EF 10% per chart review     Right Heart Cath: 2/8/18  BP: 108/65  RA: of 3 lbs overnight or concerning symptoms. [phone]    32-64 oz fluid restriction    Less than 2000 mg sodium/salt diet.  Common high sodium foods include frozen dinners, soups (not homemade), some cereal, vegetable juice, canned vegetables, lunch selam

## 2018-07-05 NOTE — PATIENT INSTRUCTIONS
Continue Bumex 2 mg in am with addition of bumex 0.5-1 mg in pm if you notice weight gain    Continue tracking daily weights. Call with weight gain of 3 lbs overnight or concerning symptoms.    [phone]    32-64 oz fluid restriction    Less than 2000 mg

## 2018-07-23 ENCOUNTER — HOSPITAL ENCOUNTER (OUTPATIENT)
Facility: HOSPITAL | Age: 83
Setting detail: OBSERVATION
Discharge: HOME HEALTH | End: 2018-07-25
Attending: EMERGENCY MEDICINE | Admitting: HOSPITALIST
Payer: MEDICARE

## 2018-07-23 ENCOUNTER — APPOINTMENT (OUTPATIENT)
Dept: GENERAL RADIOLOGY | Facility: HOSPITAL | Age: 83
End: 2018-07-23
Payer: MEDICARE

## 2018-07-23 ENCOUNTER — APPOINTMENT (OUTPATIENT)
Dept: MRI IMAGING | Facility: HOSPITAL | Age: 83
End: 2018-07-23
Attending: PHYSICIAN ASSISTANT
Payer: MEDICARE

## 2018-07-23 ENCOUNTER — PRIOR ORIGINAL RECORDS (OUTPATIENT)
Dept: OTHER | Age: 83
End: 2018-07-23

## 2018-07-23 DIAGNOSIS — R15.1 FECAL SMEARING: ICD-10-CM

## 2018-07-23 DIAGNOSIS — I50.9 ACUTE ON CHRONIC CONGESTIVE HEART FAILURE, UNSPECIFIED HEART FAILURE TYPE (HCC): Primary | ICD-10-CM

## 2018-07-23 DIAGNOSIS — M43.16 SPONDYLOLISTHESIS OF LUMBAR REGION: ICD-10-CM

## 2018-07-23 DIAGNOSIS — M47.816 LUMBAR SPONDYLOSIS: ICD-10-CM

## 2018-07-23 PROBLEM — R73.9 HYPERGLYCEMIA: Status: ACTIVE | Noted: 2018-07-23

## 2018-07-23 PROBLEM — R79.89 AZOTEMIA: Status: ACTIVE | Noted: 2018-07-23

## 2018-07-23 LAB
ANION GAP SERPL CALC-SCNC: 11 MMOL/L (ref 0–18)
BASOPHILS # BLD: 0 K/UL (ref 0–0.2)
BASOPHILS NFR BLD: 1 %
BNP SERPL-MCNC: 3743 PG/ML (ref 0–100)
BUN SERPL-MCNC: 54 MG/DL (ref 8–20)
BUN/CREAT SERPL: 20.8 (ref 10–20)
CALCIUM SERPL-MCNC: 9.5 MG/DL (ref 8.5–10.5)
CHLORIDE SERPL-SCNC: 100 MMOL/L (ref 95–110)
CHOLEST SERPL-MCNC: 123 MG/DL (ref 110–200)
CO2 SERPL-SCNC: 26 MMOL/L (ref 22–32)
CREAT SERPL-MCNC: 2.59 MG/DL (ref 0.5–1.5)
EOSINOPHIL # BLD: 0.2 K/UL (ref 0–0.7)
EOSINOPHIL NFR BLD: 2 %
ERYTHROCYTE [DISTWIDTH] IN BLOOD BY AUTOMATED COUNT: 17.4 % (ref 11–15)
GLUCOSE BLDC GLUCOMTR-MCNC: 139 MG/DL (ref 70–99)
GLUCOSE BLDC GLUCOMTR-MCNC: 205 MG/DL (ref 70–99)
GLUCOSE BLDC GLUCOMTR-MCNC: 285 MG/DL (ref 70–99)
GLUCOSE SERPL-MCNC: 157 MG/DL (ref 70–99)
HBA1C MFR BLD: 7.5 % (ref 4–6)
HCT VFR BLD AUTO: 39.5 % (ref 41–52)
HDLC SERPL-MCNC: 43 MG/DL
HGB BLD-MCNC: 12.7 G/DL (ref 13.5–17.5)
LDLC SERPL CALC-MCNC: 63 MG/DL (ref 0–99)
LYMPHOCYTES # BLD: 0.7 K/UL (ref 1–4)
LYMPHOCYTES NFR BLD: 8 %
MCH RBC QN AUTO: 28.4 PG (ref 27–32)
MCHC RBC AUTO-ENTMCNC: 32 G/DL (ref 32–37)
MCV RBC AUTO: 88.6 FL (ref 80–100)
MONOCYTES # BLD: 0.9 K/UL (ref 0–1)
MONOCYTES NFR BLD: 11 %
NEUTROPHILS # BLD AUTO: 6.7 K/UL (ref 1.8–7.7)
NEUTROPHILS NFR BLD: 79 %
NONHDLC SERPL-MCNC: 80 MG/DL
OSMOLALITY UR CALC.SUM OF ELEC: 302 MOSM/KG (ref 275–295)
PLATELET # BLD AUTO: 132 K/UL (ref 140–400)
PMV BLD AUTO: 9.4 FL (ref 7.4–10.3)
POTASSIUM SERPL-SCNC: 4.5 MMOL/L (ref 3.3–5.1)
RBC # BLD AUTO: 4.46 M/UL (ref 4.5–5.9)
SODIUM SERPL-SCNC: 137 MMOL/L (ref 136–144)
TRIGL SERPL-MCNC: 85 MG/DL (ref 1–149)
TROPONIN I SERPL-MCNC: 0.08 NG/ML (ref ?–0.03)
TROPONIN I SERPL-MCNC: 0.09 NG/ML (ref ?–0.03)
WBC # BLD AUTO: 8.5 K/UL (ref 4–11)

## 2018-07-23 PROCEDURE — 84484 ASSAY OF TROPONIN QUANT: CPT

## 2018-07-23 PROCEDURE — 85025 COMPLETE CBC W/AUTO DIFF WBC: CPT | Performed by: EMERGENCY MEDICINE

## 2018-07-23 PROCEDURE — 80048 BASIC METABOLIC PNL TOTAL CA: CPT | Performed by: EMERGENCY MEDICINE

## 2018-07-23 PROCEDURE — 83036 HEMOGLOBIN GLYCOSYLATED A1C: CPT | Performed by: HOSPITALIST

## 2018-07-23 PROCEDURE — 72148 MRI LUMBAR SPINE W/O DYE: CPT | Performed by: PHYSICIAN ASSISTANT

## 2018-07-23 PROCEDURE — 71045 X-RAY EXAM CHEST 1 VIEW: CPT | Performed by: EMERGENCY MEDICINE

## 2018-07-23 PROCEDURE — 93010 ELECTROCARDIOGRAM REPORT: CPT | Performed by: EMERGENCY MEDICINE

## 2018-07-23 PROCEDURE — 96374 THER/PROPH/DIAG INJ IV PUSH: CPT

## 2018-07-23 PROCEDURE — 80048 BASIC METABOLIC PNL TOTAL CA: CPT

## 2018-07-23 PROCEDURE — 99285 EMERGENCY DEPT VISIT HI MDM: CPT

## 2018-07-23 PROCEDURE — A4216 STERILE WATER/SALINE, 10 ML: HCPCS | Performed by: HOSPITALIST

## 2018-07-23 PROCEDURE — 93005 ELECTROCARDIOGRAM TRACING: CPT

## 2018-07-23 PROCEDURE — 83880 ASSAY OF NATRIURETIC PEPTIDE: CPT | Performed by: EMERGENCY MEDICINE

## 2018-07-23 PROCEDURE — 82962 GLUCOSE BLOOD TEST: CPT

## 2018-07-23 PROCEDURE — 80061 LIPID PANEL: CPT | Performed by: EMERGENCY MEDICINE

## 2018-07-23 PROCEDURE — 85025 COMPLETE CBC W/AUTO DIFF WBC: CPT

## 2018-07-23 PROCEDURE — 96375 TX/PRO/DX INJ NEW DRUG ADDON: CPT

## 2018-07-23 PROCEDURE — 84484 ASSAY OF TROPONIN QUANT: CPT | Performed by: EMERGENCY MEDICINE

## 2018-07-23 RX ORDER — ATORVASTATIN CALCIUM 10 MG/1
10 TABLET, FILM COATED ORAL NIGHTLY
Status: DISCONTINUED | OUTPATIENT
Start: 2018-07-23 | End: 2018-07-25

## 2018-07-23 RX ORDER — FLUTICASONE PROPIONATE 50 MCG
1 SPRAY, SUSPENSION (ML) NASAL
COMMUNITY

## 2018-07-23 RX ORDER — BUMETANIDE 0.25 MG/ML
2 INJECTION, SOLUTION INTRAMUSCULAR; INTRAVENOUS
Status: DISCONTINUED | OUTPATIENT
Start: 2018-07-23 | End: 2018-07-24

## 2018-07-23 RX ORDER — ACETAMINOPHEN 325 MG/1
650 TABLET ORAL EVERY 6 HOURS PRN
Status: DISCONTINUED | OUTPATIENT
Start: 2018-07-23 | End: 2018-07-25

## 2018-07-23 RX ORDER — SENNA AND DOCUSATE SODIUM 50; 8.6 MG/1; MG/1
1 TABLET, FILM COATED ORAL AS NEEDED
COMMUNITY

## 2018-07-23 RX ORDER — FINASTERIDE 5 MG/1
5 TABLET, FILM COATED ORAL DAILY
COMMUNITY

## 2018-07-23 RX ORDER — DIGOXIN 125 MCG
125 TABLET ORAL EVERY OTHER DAY
Status: DISCONTINUED | OUTPATIENT
Start: 2018-07-23 | End: 2018-07-25

## 2018-07-23 RX ORDER — CARVEDILOL 3.12 MG/1
3.12 TABLET ORAL 2 TIMES DAILY WITH MEALS
Status: DISCONTINUED | OUTPATIENT
Start: 2018-07-23 | End: 2018-07-25

## 2018-07-23 RX ORDER — CLOPIDOGREL BISULFATE 75 MG/1
75 TABLET ORAL DAILY
Status: DISCONTINUED | OUTPATIENT
Start: 2018-07-23 | End: 2018-07-25

## 2018-07-23 RX ORDER — SODIUM CHLORIDE 0.9 % (FLUSH) 0.9 %
3 SYRINGE (ML) INJECTION AS NEEDED
Status: DISCONTINUED | OUTPATIENT
Start: 2018-07-23 | End: 2018-07-25

## 2018-07-23 RX ORDER — ALLOPURINOL 100 MG/1
100 TABLET ORAL
Status: DISCONTINUED | OUTPATIENT
Start: 2018-07-23 | End: 2018-07-25

## 2018-07-23 RX ORDER — FINASTERIDE 5 MG/1
5 TABLET, FILM COATED ORAL DAILY
Status: DISCONTINUED | OUTPATIENT
Start: 2018-07-23 | End: 2018-07-25

## 2018-07-23 RX ORDER — ONDANSETRON 2 MG/ML
4 INJECTION INTRAMUSCULAR; INTRAVENOUS EVERY 6 HOURS PRN
Status: DISCONTINUED | OUTPATIENT
Start: 2018-07-23 | End: 2018-07-25

## 2018-07-23 RX ORDER — PANTOPRAZOLE SODIUM 40 MG/1
40 TABLET, DELAYED RELEASE ORAL
Status: DISCONTINUED | OUTPATIENT
Start: 2018-07-23 | End: 2018-07-25

## 2018-07-23 RX ORDER — LORAZEPAM 0.5 MG/1
0.5 TABLET ORAL 2 TIMES DAILY PRN
Status: DISCONTINUED | OUTPATIENT
Start: 2018-07-23 | End: 2018-07-25

## 2018-07-23 NOTE — ED PROVIDER NOTES
Patient Seen in: [facility] AND [facility] Emergency Department    History   Patient presents with:  Dyspnea ISRAEL SOB (respiratory)    Stated Complaint: SOB    HPI    Patient presents emergency department complaining of shortness of breath.   He states that he has as noted above.     Physical Exam   ED Triage Vitals [07/23/18 0747]  BP: 113/85  Pulse: 77  Resp: 18  Temp: 97 °F (36.1 °C)  Temp src: Temporal  SpO2: 100 %  O2 Device: None (Room air)    Current:/78   Pulse 70   Temp 97 °F (36.1 °C) (Temporal)   Res RBC 4.46 (*)     HGB 12.7 (*)     HCT 39.5 (*)     RDW 17.4 (*)      (*)     Lymphocyte Absolute 0.7 (*)     All other components within normal limits   CBC WITH DIFFERENTIAL WITH PLATELET    Narrative:      The following orders were created for p observation.           Disposition and Plan     Clinical Impression:  Acute on chronic congestive heart failure, unspecified heart failure type ([facility] [address]  (primary encounter diagnosis)    Disposition:  Admit  7/23/2018 11:27 am    Follow-up:  No follow-up provi

## 2018-07-23 NOTE — ED INITIAL ASSESSMENT (HPI)
Via medics from [employer].    Patient was checking his pulse ox and it was 89-90% and he was feeling short of breath    Denies CP

## 2018-07-23 NOTE — IMAGING NOTE
1425:PATIENT IS ON MRI TABLE FOR MRI OF LUMBAR SPINE;CONNECTED TO MONITOR.   1423:/69 HR 80/MIN,SPO2 97%. MEDRONICS REPRESENTATIVE PRESENT;MRI SURESCAN OPERATION PLACED ON MODE  VOO AT A RATE OF 80/MIN.   MONITOR SHOWS SINUS ARRHYTHMIA FROM VENTRICUL

## 2018-07-23 NOTE — HISTORICAL OFFICE NOTE
JAZMYNE REINA  : 10/30/1935  ACCOUNT:  [id number]  [phone]  PCP: Dr. Suman Locke     TODAY'S DATE: 2018  DICTATED BY:  [Dr. Roberson Pontiff: [Followup of Atrial fibrillation, chronic, Followup of CAD, of native vessels, Foll denies smoking. CAFFEINE: 2 cups daily. ALCOHOL: drinks rarely. EXERCISE: cardiac rehab 3X per week. DIET: low sodium diet and diabetic. MARITAL STATUS: .  OCCUPATION: retired and MD.     ALLERGIES: No Known Allergies    MEDICATIONS: Selected prescr with this approach.]    ASSESSMENT:  1. CAD, of native vessels  2. Heart failure, systolic, chronic  3. Atrial fibrillation, chronic  4. Cardiomyopathy, ischemic  5. Chronic Kidney Disease, Stage 2 (mild)  6. ICD reprogramming, biv icd  7.  Renal disease, c

## 2018-07-23 NOTE — PROGRESS NOTES
07/23/18 1115   Clinical Encounter Type   Visited With Patient   Routine Visit Introduction   Continue Visiting Yes   Confucianism Encounters   Confucianism Needs Prayer   Patient Spiritual Encounters   Spiritual Assessment Completed No     Introduced spiritu

## 2018-07-23 NOTE — ED NOTES
Pt sitting upright on cart watching t.v. Requesting food, awaiting additional lab results. Will continue to monitor.

## 2018-07-23 NOTE — CONSULTS
[facility] AND [facility]  [facility] Cardiology Consult Note    Bonita Mendez Patient Status:  Emergency    10/30/1935 MRN [id number]   Location [address] Attending Blanche Shell MD   Hosp Day # 0 PCP Kel Phelps MD [facility])    • Esophageal reflux    • Gout    • Heart attack ([facility] [address]    • High blood pressure    • High cholesterol    • Neuropathy    • Renal disorder     chronic kidney disease   • Stroke [facility])      Past Surgical History:  No date: CABG      Comment: cabg X2

## 2018-07-23 NOTE — H&P
[facility] Hospitalist H&P       CC: Patient presents with:  Dyspnea ISRAEL SOB (respiratory)       PCP: Michael Rose MD    History of Present Illness:   Mr. Evangelist Ayala is an [de-identified] yo male with hx chronic systolic chf, ICMP S/P ICD, atrial fibrillation, CKD sta Fam Hx  Family History   Problem Relation Age of Onset   • Cancer Mother      lung       Review of Systems  Comprehensive ROS reviewed and negative except for what's stated above.      OBJECTIVE:  BP 95/64 (BP Location: Right arm)   Pulse 72   Temp 98 ° ASSESSMENT / PLAN:   Mr. Miriam Knowles is an [de-identified] yo male with hx chronic systolic chf, ICMP S/P ICD, atrial fibrillation, CKD stage 3-4, malignant melanoma, CAD S/P CABG, S/P MVR, HL, HTN, DM type II, previous CVA with left sided hemiparesis who presents w house    Patient and/or patient's family given opportunity to ask questions and note understanding and agreeing with therapeutic plan as outlined    Thank Андрей Ron MD    [facility] Hospitalist  Answering Service number: [phone]

## 2018-07-23 NOTE — PLAN OF CARE
Problem: Patient Centered Care  Goal: Patient preferences are identified and integrated in the patient's plan of care  Interventions:  - What would you like us to know as we care for you?  From [facility].  - Provide timely, complet control medications as ordered  - Initiate emergency measures for life threatening arrhythmias  - Monitor electrolytes and administer replacement therapy as ordered  Outcome: Progressing

## 2018-07-24 LAB
ANION GAP SERPL CALC-SCNC: 12 MMOL/L (ref 0–18)
BASOPHILS # BLD: 0 K/UL (ref 0–0.2)
BASOPHILS NFR BLD: 1 %
BILIRUB UR QL: NEGATIVE
BUN SERPL-MCNC: 53 MG/DL (ref 8–20)
BUN/CREAT SERPL: 20.5 (ref 10–20)
CALCIUM SERPL-MCNC: 9.1 MG/DL (ref 8.5–10.5)
CHLORIDE SERPL-SCNC: 98 MMOL/L (ref 95–110)
CLARITY UR: CLEAR
CO2 SERPL-SCNC: 27 MMOL/L (ref 22–32)
COLOR UR: YELLOW
CREAT SERPL-MCNC: 2.58 MG/DL (ref 0.5–1.5)
EOSINOPHIL # BLD: 0.2 K/UL (ref 0–0.7)
EOSINOPHIL NFR BLD: 2 %
ERYTHROCYTE [DISTWIDTH] IN BLOOD BY AUTOMATED COUNT: 16.7 % (ref 11–15)
GLUCOSE BLDC GLUCOMTR-MCNC: 125 MG/DL (ref 70–99)
GLUCOSE BLDC GLUCOMTR-MCNC: 137 MG/DL (ref 70–99)
GLUCOSE BLDC GLUCOMTR-MCNC: 139 MG/DL (ref 70–99)
GLUCOSE BLDC GLUCOMTR-MCNC: 158 MG/DL (ref 70–99)
GLUCOSE SERPL-MCNC: 127 MG/DL (ref 70–99)
GLUCOSE UR-MCNC: NEGATIVE MG/DL
HCT VFR BLD AUTO: 36.2 % (ref 41–52)
HGB BLD-MCNC: 11.6 G/DL (ref 13.5–17.5)
HGB UR QL STRIP.AUTO: NEGATIVE
KETONES UR-MCNC: NEGATIVE MG/DL
LEUKOCYTE ESTERASE UR QL STRIP.AUTO: NEGATIVE
LYMPHOCYTES # BLD: 0.6 K/UL (ref 1–4)
LYMPHOCYTES NFR BLD: 8 %
MAGNESIUM SERPL-MCNC: 2.4 MG/DL (ref 1.8–2.5)
MCH RBC QN AUTO: 28.6 PG (ref 27–32)
MCHC RBC AUTO-ENTMCNC: 32.2 G/DL (ref 32–37)
MCV RBC AUTO: 88.8 FL (ref 80–100)
MONOCYTES # BLD: 1 K/UL (ref 0–1)
MONOCYTES NFR BLD: 13 %
NEUTROPHILS # BLD AUTO: 6 K/UL (ref 1.8–7.7)
NEUTROPHILS NFR BLD: 76 %
NITRITE UR QL STRIP.AUTO: NEGATIVE
OSMOLALITY UR CALC.SUM OF ELEC: 300 MOSM/KG (ref 275–295)
PH UR: 6 [PH] (ref 5–8)
PLATELET # BLD AUTO: 136 K/UL (ref 140–400)
PMV BLD AUTO: 10.9 FL (ref 7.4–10.3)
POTASSIUM SERPL-SCNC: 4.4 MMOL/L (ref 3.3–5.1)
PROT UR-MCNC: NEGATIVE MG/DL
RBC # BLD AUTO: 4.07 M/UL (ref 4.5–5.9)
SODIUM SERPL-SCNC: 137 MMOL/L (ref 136–144)
SP GR UR STRIP: 1.01 (ref 1–1.03)
UROBILINOGEN UR STRIP-ACNC: <2
VIT C UR-MCNC: NEGATIVE MG/DL
WBC # BLD AUTO: 7.8 K/UL (ref 4–11)

## 2018-07-24 PROCEDURE — 80048 BASIC METABOLIC PNL TOTAL CA: CPT | Performed by: HOSPITALIST

## 2018-07-24 PROCEDURE — A4216 STERILE WATER/SALINE, 10 ML: HCPCS | Performed by: HOSPITALIST

## 2018-07-24 PROCEDURE — 97116 GAIT TRAINING THERAPY: CPT

## 2018-07-24 PROCEDURE — 81003 URINALYSIS AUTO W/O SCOPE: CPT | Performed by: HOSPITALIST

## 2018-07-24 PROCEDURE — 85025 COMPLETE CBC W/AUTO DIFF WBC: CPT | Performed by: HOSPITALIST

## 2018-07-24 PROCEDURE — 97161 PT EVAL LOW COMPLEX 20 MIN: CPT

## 2018-07-24 PROCEDURE — 97530 THERAPEUTIC ACTIVITIES: CPT

## 2018-07-24 PROCEDURE — 82962 GLUCOSE BLOOD TEST: CPT

## 2018-07-24 PROCEDURE — 96376 TX/PRO/DX INJ SAME DRUG ADON: CPT

## 2018-07-24 PROCEDURE — 83735 ASSAY OF MAGNESIUM: CPT | Performed by: HOSPITALIST

## 2018-07-24 RX ORDER — BUMETANIDE 1 MG/1
2 TABLET ORAL 2 TIMES DAILY
Status: DISCONTINUED | OUTPATIENT
Start: 2018-07-24 | End: 2018-07-25

## 2018-07-24 RX ORDER — TRAMADOL HYDROCHLORIDE 50 MG/1
50 TABLET ORAL EVERY 12 HOURS PRN
Status: DISCONTINUED | OUTPATIENT
Start: 2018-07-24 | End: 2018-07-25

## 2018-07-24 NOTE — PROGRESS NOTES
Core Measure Update: EF 10%  Currently on long acting beta blocker. Not on ace/arb or spironolactone secondary to hypotension and renal function.

## 2018-07-24 NOTE — PHYSICAL THERAPY NOTE
PHYSICAL THERAPY QUICK EVALUATION - INPATIENT    Room Number: 503/503-A  Evaluation Date: 7/24/2018  Presenting Problem: SOB x 3 days  Physician Order: PT Eval and Treat    Problem List  Principal Problem:    Acute on chronic congestive heart failure, un limits     Lower extremity strength is within functional limits     AM-PAC '6-Clicks' INPATIENT SHORT FORM - BASIC MOBILITY  How much difficulty does the patient currently have. ..  -   Turning over in bed (including adjusting bedclothes, sheets and blanket community e.g. Going to the zoo, museum, theaters, busby etc. That will help give meaning to his life. I called Javier Salomon the  to give a list of Agencies that may help them find a short term caregiver to meet his needs.  Overall he is observed to be mod I in hi

## 2018-07-24 NOTE — PLAN OF CARE
Problem: Patient Centered Care  Goal: Patient preferences are identified and integrated in the patient's plan of care  Interventions:  - What would you like us to know as we care for you? \"I NEED TO TALK TO SOMEONE ABOUT DISCHARGE PLANS. \"  - Provide time cardiac arrhythmias or at baseline  INTERVENTIONS:  - Continuous cardiac monitoring, monitor vital signs, obtain 12 lead EKG if indicated  - Evaluate effectiveness of antiarrhythmic and heart rate control medications as ordered  - Initiate emergency measur

## 2018-07-24 NOTE — PHYSICAL THERAPY NOTE
PHYSICAL THERAPY QUICK EVALUATION - INPATIENT    Room Number: 503/503-A  Evaluation Date: 7/24/2018  Presenting Problem: SOB x 3 days  Physician Order: PT Eval and Treat    Problem List  Principal Problem:    Acute on chronic congestive heart failure, un limits     Lower extremity strength is within functional limits     AM-PAC '6-Clicks' INPATIENT SHORT FORM - BASIC MOBILITY  How much difficulty does the patient currently have. ..  -   Turning over in bed (including adjusting bedclothes, sheets and blanket community e.g. Going to the zoo, museum, theaters, busby etc. That will help give meaning to his life. I called Eugene Baptiste the SW to give a list of Agencies that may help them find a short term caregiver to meet his needs.  Overall he is observed to be mod I in hi

## 2018-07-24 NOTE — CM/SW NOTE
Face to Face completed , per , for PT/OT  Home Safety and Endurance training. / to remain available for support and/or discharge planning.          Tanis Gitelman RN Case Manager

## 2018-07-24 NOTE — CM/SW NOTE
Addend 300p:     PT/Danielle inquired about caregiver resources. SW gave and explained caregiver resources. Pt inquired about a wheelchair; SW to check w/ HME to see if pt has a qualifying dx for medicare to cover a wheelchair.  Pt stated that if medicare do referral to [facility].  SW requested STANISLAV/Fabienne for F2F.     *F2F and HHC orders needed prior to dischargeHildred Cordial, [address]

## 2018-07-24 NOTE — PROGRESS NOTES
[facility] AND [facility]  [facility] Cardiology Progress Note    Eldon Vargas Patient Status:  Observation    10/30/1935 MRN [id number]   Location [facility] Attending Alesha Galvan MD   Hosp Day # 0 PCP Silvia Nur MD     [de-identified]year old male, consu History:  No date: CABG      Comment: cabg X2  03/14/2016: CARDIAC DEFIBRILLATOR PLACEMENT      Comment: medtronic  No date: REPLACEMENT OF MITRAL VALVE      Comment: porcine  No date: TOTAL HIP REPLACEMENT      Comment: d/t avascular necrosis  Family Hist

## 2018-07-24 NOTE — PROGRESS NOTES
[facility] Hospitalist Progress Note     CC: Hospital Follow up    PCP: Vonna Apgar, MD       Assessment/Plan:     Principal Problem:    Acute on chronic congestive heart failure, unspecified heart failure type [facility])  Active Problems:    Acute on chroni II  -HgbA1C 7.5  -home regimen: tradjneta 5 mg daily  -ADA diet       GERD  -PPI, carafate prn     Gout  -continue allopurinol     HL  -statin     Chronic Anemia  -per Epic hgb 10.8-12.6, currently at 12.7  -follow hgb     FN:  - IVF:none  - Diet:ada diet PLT  132*  136*         Recent Labs   Lab  07/23/18   0756  07/24/18   0525   GLU  157*  127*   BUN  54*  53*   CREATSERUM  2.59*  2.58*   GFRAA  26*  26*   GFRNAA  22*  23*   CA  9.5  9.1   NA  137  137   K  4.5  4.4   CL  100  98   CO2  26  27       No

## 2018-07-24 NOTE — OCCUPATIONAL THERAPY NOTE
Spoke with pt and nurse after observing pt use the bathroom and return to bed. Pt managing tasks with the supervision of the nurse, using R/W. Skilled OT services do not appear to be indicated in this setting at this time. Pt in agreement. Nurse aware.

## 2018-07-25 ENCOUNTER — PRIOR ORIGINAL RECORDS (OUTPATIENT)
Dept: OTHER | Age: 83
End: 2018-07-25

## 2018-07-25 VITALS
BODY MASS INDEX: 21.26 KG/M2 | SYSTOLIC BLOOD PRESSURE: 102 MMHG | HEIGHT: 66 IN | OXYGEN SATURATION: 100 % | RESPIRATION RATE: 16 BRPM | DIASTOLIC BLOOD PRESSURE: 62 MMHG | HEART RATE: 71 BPM | WEIGHT: 132.31 LBS | TEMPERATURE: 98 F

## 2018-07-25 PROBLEM — R79.89 AZOTEMIA: Status: RESOLVED | Noted: 2018-07-23 | Resolved: 2018-07-25

## 2018-07-25 PROBLEM — R73.9 HYPERGLYCEMIA: Status: RESOLVED | Noted: 2018-07-23 | Resolved: 2018-07-25

## 2018-07-25 PROBLEM — I50.9 ACUTE ON CHRONIC CONGESTIVE HEART FAILURE, UNSPECIFIED HEART FAILURE TYPE (HCC): Status: RESOLVED | Noted: 2018-07-23 | Resolved: 2018-07-25

## 2018-07-25 PROBLEM — I50.9 ACUTE ON CHRONIC CONGESTIVE HEART FAILURE (HCC): Status: RESOLVED | Noted: 2018-07-23 | Resolved: 2018-07-25

## 2018-07-25 LAB
ANION GAP SERPL CALC-SCNC: 7 MMOL/L (ref 0–18)
BUN SERPL-MCNC: 56 MG/DL (ref 8–20)
BUN/CREAT SERPL: 22 (ref 10–20)
CALCIUM SERPL-MCNC: 9.4 MG/DL (ref 8.5–10.5)
CHLORIDE SERPL-SCNC: 100 MMOL/L (ref 95–110)
CO2 SERPL-SCNC: 31 MMOL/L (ref 22–32)
CREAT SERPL-MCNC: 2.55 MG/DL (ref 0.5–1.5)
GLUCOSE BLDC GLUCOMTR-MCNC: 158 MG/DL (ref 70–99)
GLUCOSE BLDC GLUCOMTR-MCNC: 160 MG/DL (ref 70–99)
GLUCOSE SERPL-MCNC: 142 MG/DL (ref 70–99)
MAGNESIUM SERPL-MCNC: 2.2 MG/DL (ref 1.8–2.5)
OSMOLALITY UR CALC.SUM OF ELEC: 304 MOSM/KG (ref 275–295)
POTASSIUM SERPL-SCNC: 4.3 MMOL/L (ref 3.3–5.1)
SODIUM SERPL-SCNC: 138 MMOL/L (ref 136–144)

## 2018-07-25 PROCEDURE — 82962 GLUCOSE BLOOD TEST: CPT

## 2018-07-25 PROCEDURE — 80048 BASIC METABOLIC PNL TOTAL CA: CPT | Performed by: HOSPITALIST

## 2018-07-25 PROCEDURE — 83735 ASSAY OF MAGNESIUM: CPT | Performed by: HOSPITALIST

## 2018-07-25 RX ORDER — BUMETANIDE 2 MG/1
2 TABLET ORAL 2 TIMES DAILY
Qty: 60 TABLET | Refills: 1 | Status: SHIPPED | OUTPATIENT
Start: 2018-07-25 | End: 2018-07-25

## 2018-07-25 RX ORDER — BUMETANIDE 1 MG/1
TABLET ORAL
Qty: 90 TABLET | Refills: 1 | Status: SHIPPED | OUTPATIENT
Start: 2018-07-25 | End: 2018-08-08

## 2018-07-25 NOTE — BH PROGRESS NOTE
Behavioral Health Note  CHIEF COMPLAINT  SOB  REASON FOR ADMISSION  CHF  SOCIAL HISTORY  The patient lives at [facility]. No smoking, drinking or drugs. PAST PSYCHIATRIC HISTORY  Patient reports a h/o depression and anxiety.  The patient is taking ativ hallucinations  Insight: good  Judgment: good  SUICIDAL RISK ASSESSMENT  No SI/HI  ASSESSMENT  The patient has a dx depressive disorder and anxiety disorder. PLAN  1. Referral placed to [facility]  2.  Referrals provided for Behavior

## 2018-07-25 NOTE — PROGRESS NOTES
07/25/18 1324   Clinical Encounter Type   Visited With Patient   Routine Visit Introduction   Continue Visiting Yes   Patient Spiritual Encounters   Spiritual Assessment Completed No  (Pt eating lunch; will attempt to visit again later)

## 2018-07-25 NOTE — PROGRESS NOTES
[facility]    Progress Note    Alia Robles Patient Status:  Observation    10/30/1935 MRN [id number]   Location [facility] Attending Alfredo Brantley MD   Hosp Day # 0 PCP Jennifer Bay MD     Subjective:     Respiratory: 0.09 () 07/23/2018       Mri Spine Lumbar (cpt=72148)    Result Date: 7/23/2018  CONCLUSION:   Degenerative disc and facet disease throughout the lumbar spine, most prominent at L3-4 and L4-5.   Severe narrowing of the right neural foramen at L3-L4 and of

## 2018-07-25 NOTE — DISCHARGE SUMMARY
[facility] Hospitalist Discharge Summary   Patient ID:  Kenny Hussein  [id number]  [de-identified]year old  10/30/1935    Admit date: 7/23/2018  Discharge date: 7/25/2018  Risk of Readmission Lace+ Score: 73  59-90 High Risk  29-58 Medium Risk  0-28   Low Risk    Primary C to dc home  - continue digoxin, Coreg, entresto stopped as outpatient  - Bumex IV BID ordered per cards diuresed well, transitioned to PO prior to dc  - Cards followed while inpatient, to have close outpatient follow up information     Elevated Troponin in neural foramen at L4-5. Bony encroachment upon the exiting right L3 and bilateral L4 nerve roots. Edema throughout the posterior paraspinal musculature suggestive of muscular strains. No high-grade canal narrowing.   Multilevel degenerative spondylolisth Susp  Commonly known as:  FLONASE     GLUCOCARD VITAL TEST Strp  Testing twice daily     LORazepam 0.5 MG Tabs  Commonly known as:  ATIVAN  Take 1 tablet (0.5 mg total) by mouth 2 (two) times daily as needed for Anxiety.      NASONEX NA     Pantoprazole Sod

## 2018-07-25 NOTE — PLAN OF CARE
Problem: Patient Centered Care  Goal: Patient preferences are identified and integrated in the patient's plan of care  Interventions:  - What would you like us to know as we care for you? \"I NEED TO TALK TO SOMEONE ABOUT DISCHARGE PLANS. \"  - Provide time Absence of cardiac arrhythmias or at baseline  INTERVENTIONS:  - Continuous cardiac monitoring, monitor vital signs, obtain 12 lead EKG if indicated  - Evaluate effectiveness of antiarrhythmic and heart rate control medications as ordered  - Initiate emerg

## 2018-07-25 NOTE — TRANSITION NOTE
Acute on chronic systolic chf                Sob improved- euvolemic on exam                Ef 10% / MEDTRONIC ICD                Weight 132- negative fluid balance (goal wt 133-135)                Continue dig (renal dose), coreg , po bumex 2 mg in am 1

## 2018-07-25 NOTE — PLAN OF CARE
CARDIOVASCULAR - ADULT    • Maintains optimal cardiac output and hemodynamic stability Completed    • Absence of cardiac arrhythmias or at baseline Completed        Diabetes/Glucose Control    • Glucose maintained within prescribed range Completed        P

## 2018-07-25 NOTE — RESTORATIVE THERAPY
RESTORATIVE CARE TREATMENT NOTE    Presenting Problem  Presenting Problem: SOB x 3 days          Precautions  Precautions: Cardiac       Weight Bearing Restriction  Weight Bearing Restriction: None                   SUNDAY MONDAY TUESDAY WEDNESDAY THURSDAY

## 2018-07-25 NOTE — CM/SW NOTE
Addend 201p:      RN stated that pt is medically cleared to discharge and will need medicar transport. SW spoke w/ pt who is agreeable w/ medicar fees.  SW spoke w/ Brenden Hidalgo from [facility] and arranged medicar transport to pt's home, [facility], [address]

## 2018-07-26 ENCOUNTER — TELEPHONE (OUTPATIENT)
Dept: CARDIOLOGY UNIT | Facility: HOSPITAL | Age: 83
End: 2018-07-26

## 2018-08-01 ENCOUNTER — OFFICE VISIT (OUTPATIENT)
Dept: CARDIOLOGY CLINIC | Facility: HOSPITAL | Age: 83
End: 2018-08-01
Attending: INTERNAL MEDICINE
Payer: MEDICARE

## 2018-08-01 VITALS
OXYGEN SATURATION: 99 % | SYSTOLIC BLOOD PRESSURE: 108 MMHG | BODY MASS INDEX: 22 KG/M2 | WEIGHT: 139 LBS | DIASTOLIC BLOOD PRESSURE: 65 MMHG | HEART RATE: 86 BPM

## 2018-08-01 DIAGNOSIS — I50.22 CHRONIC SYSTOLIC HEART FAILURE (HCC): ICD-10-CM

## 2018-08-01 DIAGNOSIS — I50.9 HEART FAILURE, UNSPECIFIED (HCC): Primary | ICD-10-CM

## 2018-08-01 LAB
ANION GAP SERPL CALC-SCNC: 10 MMOL/L (ref 0–18)
BUN SERPL-MCNC: 58 MG/DL (ref 8–20)
BUN/CREAT SERPL: 21.1 (ref 10–20)
CALCIUM SERPL-MCNC: 9.6 MG/DL (ref 8.5–10.5)
CHLORIDE SERPL-SCNC: 98 MMOL/L (ref 95–110)
CO2 SERPL-SCNC: 28 MMOL/L (ref 22–32)
CREAT SERPL-MCNC: 2.75 MG/DL (ref 0.5–1.5)
GLUCOSE SERPL-MCNC: 168 MG/DL (ref 70–99)
OSMOLALITY UR CALC.SUM OF ELEC: 302 MOSM/KG (ref 275–295)
POTASSIUM SERPL-SCNC: 5.2 MMOL/L (ref 3.3–5.1)
SODIUM SERPL-SCNC: 136 MMOL/L (ref 136–144)

## 2018-08-01 PROCEDURE — 36415 COLL VENOUS BLD VENIPUNCTURE: CPT | Performed by: CLINICAL NURSE SPECIALIST

## 2018-08-01 PROCEDURE — 99214 OFFICE O/P EST MOD 30 MIN: CPT | Performed by: CLINICAL NURSE SPECIALIST

## 2018-08-01 PROCEDURE — 80048 BASIC METABOLIC PNL TOTAL CA: CPT | Performed by: CLINICAL NURSE SPECIALIST

## 2018-08-01 PROCEDURE — 99211 OFF/OP EST MAY X REQ PHY/QHP: CPT | Performed by: CLINICAL NURSE SPECIALIST

## 2018-08-01 NOTE — PATIENT INSTRUCTIONS
Please increase bumex to 2 mg in the am and 1 mg in the pm    Continue tracking daily weights. Call with weight gain of 3 lbs overnight or concerning symptoms. [phone]    32-64 oz fluid restriction    Less than 2000 mg sodium/salt diet.  Common high

## 2018-08-01 NOTE — PROGRESS NOTES
[address] [place] [address] Patient Status:  Outpatient    10/30/1935 MRN [id number]   Location MD Dr Loretta Whitehead Dr is a [de-identified]year old male who presents to i to go to sleep at night for fear he might not wake up. He will take an occasional ativan which alleviates his symptoms.      Review of Systems:  Constitutional: negative  Respiratory: positive for mild cough with phlegm-associates this with his seasonal all normal, atraumatic, no cyanosis or edema  Pulses: 2+ and symmetric  Neurologic: Grossly normal    Cardiographics:  ECG: V-paced 71 bmp     Echocardiogram: EF 10% per chart review     Right Heart Cath: 2/8/18  BP: 108/65  RA: v: 21, m 16  RV: 61/15  PA: 68/ lbs overnight or concerning symptoms. [phone]    32-64 oz fluid restriction    Less than 2000 mg sodium/salt diet.  Common high sodium foods include frozen dinners, soups (not homemade), some cereal, vegetable juice, canned vegetables, lunch meats, p

## 2018-08-08 ENCOUNTER — OFFICE VISIT (OUTPATIENT)
Dept: CARDIOLOGY CLINIC | Facility: HOSPITAL | Age: 83
End: 2018-08-08
Attending: INTERNAL MEDICINE
Payer: MEDICARE

## 2018-08-08 ENCOUNTER — PRIOR ORIGINAL RECORDS (OUTPATIENT)
Dept: OTHER | Age: 83
End: 2018-08-08

## 2018-08-08 VITALS
BODY MASS INDEX: 22 KG/M2 | DIASTOLIC BLOOD PRESSURE: 70 MMHG | OXYGEN SATURATION: 99 % | WEIGHT: 137.81 LBS | SYSTOLIC BLOOD PRESSURE: 109 MMHG | HEART RATE: 71 BPM

## 2018-08-08 DIAGNOSIS — I50.22 CHRONIC SYSTOLIC HEART FAILURE (HCC): ICD-10-CM

## 2018-08-08 DIAGNOSIS — I50.9 HEART FAILURE, UNSPECIFIED (HCC): Primary | ICD-10-CM

## 2018-08-08 LAB
ANION GAP SERPL CALC-SCNC: 14 MMOL/L (ref 0–18)
BASOPHILS # BLD: 0 K/UL (ref 0–0.2)
BASOPHILS NFR BLD: 0 %
BUN SERPL-MCNC: 55 MG/DL (ref 8–20)
BUN/CREAT SERPL: 21.3 (ref 10–20)
CALCIUM SERPL-MCNC: 9.4 MG/DL (ref 8.5–10.5)
CHLORIDE SERPL-SCNC: 94 MMOL/L (ref 95–110)
CO2 SERPL-SCNC: 25 MMOL/L (ref 22–32)
CREAT SERPL-MCNC: 2.58 MG/DL (ref 0.5–1.5)
EOSINOPHIL # BLD: 0.1 K/UL (ref 0–0.7)
EOSINOPHIL NFR BLD: 1 %
ERYTHROCYTE [DISTWIDTH] IN BLOOD BY AUTOMATED COUNT: 17.6 % (ref 11–15)
GLUCOSE SERPL-MCNC: 199 MG/DL (ref 70–99)
HCT VFR BLD AUTO: 38.5 % (ref 41–52)
HGB BLD-MCNC: 12.3 G/DL (ref 13.5–17.5)
LYMPHOCYTES # BLD: 0.6 K/UL (ref 1–4)
LYMPHOCYTES NFR BLD: 6 %
MCH RBC QN AUTO: 27.9 PG (ref 27–32)
MCHC RBC AUTO-ENTMCNC: 32 G/DL (ref 32–37)
MCV RBC AUTO: 87 FL (ref 80–100)
MONOCYTES # BLD: 1.2 K/UL (ref 0–1)
MONOCYTES NFR BLD: 11 %
NEUTROPHILS # BLD AUTO: 8.9 K/UL (ref 1.8–7.7)
NEUTROPHILS NFR BLD: 82 %
OSMOLALITY UR CALC.SUM OF ELEC: 297 MOSM/KG (ref 275–295)
PLATELET # BLD AUTO: 166 K/UL (ref 140–400)
PMV BLD AUTO: 9.3 FL (ref 7.4–10.3)
POTASSIUM SERPL-SCNC: 5.3 MMOL/L (ref 3.3–5.1)
RBC # BLD AUTO: 4.43 M/UL (ref 4.5–5.9)
SODIUM SERPL-SCNC: 133 MMOL/L (ref 136–144)
WBC # BLD AUTO: 10.8 K/UL (ref 4–11)

## 2018-08-08 PROCEDURE — 85025 COMPLETE CBC W/AUTO DIFF WBC: CPT | Performed by: CLINICAL NURSE SPECIALIST

## 2018-08-08 PROCEDURE — 80048 BASIC METABOLIC PNL TOTAL CA: CPT | Performed by: CLINICAL NURSE SPECIALIST

## 2018-08-08 PROCEDURE — 99211 OFF/OP EST MAY X REQ PHY/QHP: CPT | Performed by: CLINICAL NURSE SPECIALIST

## 2018-08-08 PROCEDURE — 36415 COLL VENOUS BLD VENIPUNCTURE: CPT | Performed by: CLINICAL NURSE SPECIALIST

## 2018-08-08 PROCEDURE — 99214 OFFICE O/P EST MOD 30 MIN: CPT | Performed by: CLINICAL NURSE SPECIALIST

## 2018-08-08 RX ORDER — BUMETANIDE 1 MG/1
TABLET ORAL
Qty: 90 TABLET | Refills: 1 | COMMUNITY
Start: 2018-08-08

## 2018-08-08 NOTE — PATIENT INSTRUCTIONS
Please decrease bumex to 2 mg in the morning    Please have a basic metabolic profile (BMP) drawn on 8/15 and fax results to Ayo Kovacs [phone]    Please make sure you drink at least 32 oz fluid/day. Please increase your fluid intake by 4-6 oz.      Co

## 2018-08-08 NOTE — PROGRESS NOTES
[address] [place] [address] Patient Status:  Outpatient    10/30/1935 MRN [id number]   Location Rojelio Dux, MD Dr Dola Quivers Dr Frankey [place] is a [de-identified]year old male who presents to i his symptoms.      Review of Systems:  Constitutional: negative  Respiratory: positive for mild cough with phlegm-associates this with his seasonal allergies,, negative for wheezing  Cardiovascular: negative for chest pain, fatigue and paroxysmal nocturnal normal    Cardiographics:  ECG: V-paced 71 bmp     Echocardiogram: EF 10% per chart review     Right Heart Cath: 2/8/18  BP: 108/65  RA: v: 21, m 16  RV: 61/15  PA: 68/28 m: 35  PCWP: 26    Dave CO/CI: 3.1 / 1.8  Dave PRVR/PVRI: 2.9 / 5.0  Dave SVR: [address] [phone] - number for his ride        Plan:  Please decrease bumex to 2 mg in the morning    Please have a basic metabolic profile (BMP) drawn on 8/15 and fax results to Sherrie Acosta [phone]    Please make sure you drink at least 32 oz flui

## 2018-08-17 LAB
BNP: 3743 PMOL/L
BUN: 55 MG/DL
CALCIUM: 9.4 MG/DL
CHLORIDE: 94 MEQ/L
CHOLESTEROL, TOTAL: 123 MG/DL
CREATININE, SERUM: 2.25 MG/DL
GLUCOSE: 199 MG/DL
HDL CHOLESTEROL: 43 MG/DL
HEMATOCRIT: 38.5 %
HEMOGLOBIN A1C: 7.5 %
HEMOGLOBIN: 12.3 G/DL
LDL CHOLESTEROL: 63 MG/DL
MAGNESIUM: 2.2 MG/DL
NON-HDL CHOLESTEROL: 80 MG/DL
PLATELETS: 166 K/UL
POTASSIUM, SERUM: 5.3 MEQ/L
RED BLOOD COUNT: 4.43 X 10-6/U
SODIUM: 133 MEQ/L
TRIGLYCERIDES: 85 MG/DL
WHITE BLOOD COUNT: 10.8 X 10-3/U

## 2019-02-28 VITALS
HEART RATE: 79 BPM | BODY MASS INDEX: 24.17 KG/M2 | RESPIRATION RATE: 20 BRPM | WEIGHT: 154 LBS | DIASTOLIC BLOOD PRESSURE: 78 MMHG | SYSTOLIC BLOOD PRESSURE: 100 MMHG | HEIGHT: 67 IN | OXYGEN SATURATION: 97 %

## 2019-02-28 VITALS
DIASTOLIC BLOOD PRESSURE: 52 MMHG | HEIGHT: 67 IN | HEART RATE: 89 BPM | OXYGEN SATURATION: 98 % | RESPIRATION RATE: 18 BRPM | SYSTOLIC BLOOD PRESSURE: 96 MMHG | WEIGHT: 140 LBS | BODY MASS INDEX: 21.97 KG/M2

## 2019-02-28 VITALS
SYSTOLIC BLOOD PRESSURE: 114 MMHG | BODY MASS INDEX: 22.88 KG/M2 | HEIGHT: 68 IN | WEIGHT: 151 LBS | DIASTOLIC BLOOD PRESSURE: 62 MMHG | HEART RATE: 74 BPM | RESPIRATION RATE: 16 BRPM

## 2019-02-28 VITALS
HEIGHT: 68 IN | HEART RATE: 46 BPM | OXYGEN SATURATION: 98 % | DIASTOLIC BLOOD PRESSURE: 56 MMHG | WEIGHT: 150 LBS | SYSTOLIC BLOOD PRESSURE: 100 MMHG | BODY MASS INDEX: 22.73 KG/M2

## 2019-02-28 VITALS
OXYGEN SATURATION: 98 % | HEIGHT: 68 IN | HEART RATE: 78 BPM | SYSTOLIC BLOOD PRESSURE: 90 MMHG | WEIGHT: 155 LBS | RESPIRATION RATE: 18 BRPM | BODY MASS INDEX: 23.49 KG/M2 | DIASTOLIC BLOOD PRESSURE: 58 MMHG

## 2019-02-28 VITALS
HEART RATE: 75 BPM | WEIGHT: 142 LBS | SYSTOLIC BLOOD PRESSURE: 90 MMHG | RESPIRATION RATE: 18 BRPM | DIASTOLIC BLOOD PRESSURE: 54 MMHG | BODY MASS INDEX: 21.52 KG/M2 | HEIGHT: 68 IN

## 2019-02-28 VITALS
HEIGHT: 67 IN | BODY MASS INDEX: 21.66 KG/M2 | DIASTOLIC BLOOD PRESSURE: 60 MMHG | SYSTOLIC BLOOD PRESSURE: 120 MMHG | RESPIRATION RATE: 18 BRPM | WEIGHT: 138 LBS | OXYGEN SATURATION: 98 % | HEART RATE: 84 BPM

## 2021-04-09 NOTE — PATIENT INSTRUCTIONS
Continue bumex 2 mg in the morning and 1 mg in the afternoon    Continue tracking daily weights. Call with weight gain of 3 lbs overnight or concerning symptoms. [phone]    32-64 oz fluid restriction    Less than 2000 mg sodium/salt diet.  Common hig Detail Level: Zone

## 2022-02-01 NOTE — PLAN OF CARE
CARDIOVASCULAR - ADULT    • Maintains optimal cardiac output and hemodynamic stability Progressing    • Absence of cardiac arrhythmias or at baseline Progressing        Diabetes/Glucose Control    • Glucose maintained within prescribed range Progressing unknown

## 2024-07-31 NOTE — PATIENT INSTRUCTIONS
Continue Bumex 2 mg in am with addition of bumex 0.5-1 mg in pm if notice weight gain  Continue tracking daily weights. Call with weight gain of 3 lbs overnight or concerning symptoms.    [phone]    32-64 oz fluid restriction    Less than 2000 mg sodiu
171.9

## (undated) NOTE — IP AVS SNAPSHOT
Patient Demographics     Address  Rachel Hammonds [address] [facility] [address] [facility] [address] Phone  [phone] [facility]  [phone] [facility]      Emergency Contact(s)     Name Relation Home Work [address] [place] [address] Ave.        [address] [facility] Take 1 tablet (50 mg total) by mouth every 12 (twelve) hours as needed for Pain. What changed:  See the new instructions. * This list has 2 medication(s) that are the same as other medications prescribed for you.  Read the directions carefully, and Specialties:  CARDIOLOGY, Internal Medicine  Why:  10:15 am   Contact information:  [address]  [address] Alfredo Sinha MD. Schedule an appointment as soon as possible for a visit in 1 week Kel Phelps MD         Fexofenadine HCl 180 MG Tabs  Commonly known as:  ALLEGRA  Next dose due: Tomorrow 02/02      Take 180 mg by mouth daily. GLUCOCARD VITAL TEST Strp  Next dose due:   Tonight 02/01      Testing twice daily   MCGOVERN [id number] Cholecalciferol (VITAMIN D) 1000 units tab 1,000 Units 02/01/18 0914 Given      [id number] Clopidogrel Bisulfate (PLAVIX) tab 75 mg 02/01/18 0914 Given      [id number] LORazepam (ATIVAN) tab 0.5 mg 01/31/18 1953 Given      [id number] Pantoprazole S Ordering provider:  Alesha Galvan MD  01/31/18 6381 Resulting lab:  [facility]   Narrative: The following orders were created for panel order CBC WITH DIFFERENTIAL WITH PLATELET.   Procedure                               Abnormality         Status Chronic Kidney Disease: GFR <60 ml/min/1.73 m2  Kidney failure: GFR <15 ml/min/1.73 m2    The accuracy of the MDRD equation is not suitable for acute renal failure patients and it is not recommended for use with pregnant women.             MAGNESIUM [[id number] Addendum    :  Olayinka Robles MD (Physician)    Related Notes:  Original Note by Olayinka Robles MD (Physician) filed at 1/30/2018  5:48 PM         [facility] Hospitalist H&P       CC: Patient presents with:  Fatigue (constitutional, neurologic) CODE: DNR- confirmed with patient[GV.2]    Dispo: pending clinical course    Outpatient records or previous hospital records reviewed. Further recommendations pending patient's clinical course.   [facility] hospitalist to continue to follow patient while in [facility] encounter [facility] Encounter).       Soc Hx     Smoking status: Never Smoker    Smokeless tobacco: Never Used    Alcohol use No        Fam Hx  Family History   Problem Relation Age of Onset   • Cancer Mother      lung       Review of Systems  Comprehensive infarction. 3. Senescent changes of parenchymal volume loss with sequela of chronic microangiopathy and large vessel atherosclerosis. 4. Lesser incidental findings as above.          Xr Chest Ap Portable  (cpt=71045)    Result Date: 1/30/2018  CONCLUSION: Atrial Fibrillation with Uncontrolled HR S/P RFA AV Ablation  - s/p Rfa AV ablation last admission on 1/10/18  - eliquis    Intermittent Hypotension   - BP stable in ED  - previous admission with SBP 80s-100s asymptomatic  - monitor     Elevated Troponin i cough, flu like symptoms. Weight has been stable, but did try taking increased bumex dose for past 2 days because of the swelling. [GV.2]       PMH  Past Medical History:   Diagnosis Date   • Atrial fibrillation ([facility] [address]    • BPH (benign prostatic hyperplasia) PLT  154         Recent Labs   Lab  01/30/18   1246   GLU  154*   BUN  41*   CREATSERUM  2.51*   GFRAA  30*   GFRNAA  25*   CA  9.3   NA  141   K  4.4   CL  105   CO2  26       Lab Results  Component Value Date   WBC 6.5 01/30/2018   HGB 11.7 01/30/2018 Admit date: 1/30/2018    Discharge date and time:[TN.1] 2/1/18[TN.2]    Attending Physician: Billie Nolen MD     Consults: IP CONSULT TO HOSPITALIST  IP CONSULT TO CARDIOLOGY  IP CONSULT TO SOCIAL WORK    Primary Care Physician: Renate Del Rosario MD MVR, HL, HTN, DM type II, previous CVA with left sided hemiparesis who presents with generalized fatigue and increasing shortness of breath/leg swelling, found to have acute on chronic CHF, started IV bumex with improvement in symptoms, seen by cardiology CONTINUE taking these medications    acetaminophen 325 MG Tabs  Commonly known as:  TYLENOL     allopurinol 100 MG Tabs  Commonly known as:  ZYLOPRIM     apixaban 2.5 MG Tabs  Commonly known as:  ELIQUIS     carvedilol 3.125 MG Tabs  Commonly known as:  CO [address] instructions: Other Discharge Instructions:         Please check BMP in 1 week, results to cardiology. Please follow up with Dr. Dorcas Hamilton as directed.   Please follow up w Commonly known as:  COREG  Take 1 tablet (3.125 mg total) by mouth 2 (two) times daily with meals. Clopidogrel Bisulfate 75 MG Tabs  Commonly known as:  PLAVIX  Take 1 tablet (75 mg total) by mouth daily.      digoxin 0.125 MG Tabs  Commonly known as: Cath Angiogram Results (HF Patients only)    No exam resulted this encounter.           Physical Therapy Notes (last 72 hours) (Notes from 1/29/2018  2:53 PM through 2/1/2018  2:53 PM)      Physical Therapy Note signed by Irma Jolley PT at 1/31/2018 PT Treatment Plan: Gait training;Strengthening;Transfer training  Rehab Potential : Good  Frequency (Obs): Daily       PHYSICAL THERAPY MEDICAL/SOCIAL HISTORY     History related to current admission:     Problem List  Principal Problem:    Fatigue, unspec RANGE OF MOTION AND STRENGTH ASSESSMENT  Upper extremity ROM and strength are within functional limits     Lower extremity ROM is within functional limits     Lower extremity strength is within functional limits     BALANCE           Dynamic Standing:  [facility] Goal #1 Patient is able to demonstrate transfers Sit to/from Stand at assistance level: independent with cane - straight     Goal #1  Current Status    Goal #2 Patient is able to ambulate 150 feet with assist device: walker - rolling at assistance level: i Reason for Therapy: ADL/IADL Dysfunction and Discharge Planning    OCCUPATIONAL THERAPY ASSESSMENT     Patient is a[SF.3 [de-identified]year old[SF.2] male admitted 1/30/2018 for fatigue. Pt seen in cooperation with PT after nurse approves pt participation.  Pt receiv Fatigue    Cardiomyopathy, unspecified type (HCC)    Dyspnea, unspecified type[SF.2]      Past Medical History[SF.1]  Past Medical History:   Diagnosis Date   • Atrial fibrillation ([facility] [address]    • BPH (benign prostatic hyperplasia)    • CKD stage 3 secondary Upper extremity ROM is within functional limits  STRENGTH ASSESSMENT  Upper extremity strength is within functional limits       ACTIVITIES OF DAILY LIVING ASSESSMENT  AM-PAC ‘6-Clicks’ Inpatient Daily Activity Short Form  How much help from another person SF.2 - Jhon Melton OT on 1/31/2018  1:57 PM                     Video Swallow Study Notes     No notes of this type exist for this encounter. SLP Notes     No notes of this type exist for this encounter.             Immunizations     Name Date

## (undated) NOTE — IP AVS SNAPSHOT
[facility]            (For Outpatient Use Only) Initial Admit Date: 1/30/2018   Inpt/Obs Admit Date: Inpt: 1/30/18 / Obs: 01/31/18   Discharge Date:    Caitlyn Mary:  [de-identified]   MRN: [de-identified]   CSN: [id number]        ENCOUNTER  Patient

## (undated) NOTE — ED AVS SNAPSHOT
Kenny Hussein   MRN: [id number]    Department:  [facility] Emergency Department   Date of Visit:  6/18/2018           Disclosure     Insurance plans vary and the physician(s) referred by the ER may not be covered by your plan.  Please contact you within the next three months to obtain basic health screening including reassessment of your blood pressure.     IF THERE IS ANY CHANGE OR WORSENING OF YOUR CONDITION, CALL YOUR PRIMARY CARE PHYSICIAN AT ONCE OR RETURN IMMEDIATELY TO THE EMERGENCY DEPARTMEN

## (undated) NOTE — LETTER
[address], [place]  Authorization for Invasive Procedures  1.  I hereby authorize Dr. Charlette Garay, my physician and whomever may be designated as the doctor's assistant, to perform the following operation and/or procedure:  Yenny Diane performed for the purposes of advancing medicine, science, and/or education, provided my identity is not revealed. If the procedure has been videotaped, the physician/surgeon will obtain the original videotape.  The hospital will not be responsible for stor My signature below affirms that prior to the time of the procedure, I have explained to the patient and/or his legal representative, the risks and benefits involved in the proposed treatment and any reasonable alternative to the proposed treatment.  I have